# Patient Record
Sex: MALE | Race: BLACK OR AFRICAN AMERICAN | NOT HISPANIC OR LATINO | URBAN - METROPOLITAN AREA
[De-identification: names, ages, dates, MRNs, and addresses within clinical notes are randomized per-mention and may not be internally consistent; named-entity substitution may affect disease eponyms.]

---

## 2020-04-08 ENCOUNTER — INPATIENT (INPATIENT)
Facility: HOSPITAL | Age: 45
LOS: 3 days | Discharge: HOME HEALTH | End: 2020-04-12
Attending: INTERNAL MEDICINE | Admitting: INTERNAL MEDICINE
Payer: MEDICARE

## 2020-04-08 VITALS
RESPIRATION RATE: 16 BRPM | DIASTOLIC BLOOD PRESSURE: 59 MMHG | SYSTOLIC BLOOD PRESSURE: 120 MMHG | HEART RATE: 78 BPM | TEMPERATURE: 99 F | WEIGHT: 250 LBS | OXYGEN SATURATION: 100 %

## 2020-04-08 LAB
ALBUMIN SERPL ELPH-MCNC: 4.2 G/DL — SIGNIFICANT CHANGE UP (ref 3.5–5.2)
ALP SERPL-CCNC: 154 U/L — HIGH (ref 30–115)
ALT FLD-CCNC: 62 U/L — HIGH (ref 0–41)
AMMONIA BLD-MCNC: 62 UMOL/L — HIGH (ref 11–55)
ANION GAP SERPL CALC-SCNC: 19 MMOL/L — HIGH (ref 7–14)
ANION GAP SERPL CALC-SCNC: 30 MMOL/L — HIGH (ref 7–14)
ANION GAP SERPL CALC-SCNC: 34 MMOL/L — HIGH (ref 7–14)
APAP SERPL-MCNC: <5 UG/ML — LOW (ref 10–30)
APPEARANCE UR: CLEAR — SIGNIFICANT CHANGE UP
AST SERPL-CCNC: 70 U/L — HIGH (ref 0–41)
B-OH-BUTYR SERPL-SCNC: 7.3 MMOL/L — HIGH
BACTERIA # UR AUTO: NEGATIVE — SIGNIFICANT CHANGE UP
BASE EXCESS BLDV CALC-SCNC: -18 MMOL/L — LOW (ref -2–2)
BASOPHILS # BLD AUTO: 0.01 K/UL — SIGNIFICANT CHANGE UP (ref 0–0.2)
BASOPHILS NFR BLD AUTO: 0.1 % — SIGNIFICANT CHANGE UP (ref 0–1)
BILIRUB SERPL-MCNC: 0.5 MG/DL — SIGNIFICANT CHANGE UP (ref 0.2–1.2)
BILIRUB UR-MCNC: NEGATIVE — SIGNIFICANT CHANGE UP
BUN SERPL-MCNC: 78 MG/DL — CRITICAL HIGH (ref 10–20)
BUN SERPL-MCNC: 83 MG/DL — CRITICAL HIGH (ref 10–20)
BUN SERPL-MCNC: 86 MG/DL — CRITICAL HIGH (ref 10–20)
CA-I SERPL-SCNC: 1.06 MMOL/L — LOW (ref 1.12–1.3)
CALCIUM SERPL-MCNC: 8.1 MG/DL — LOW (ref 8.5–10.1)
CALCIUM SERPL-MCNC: 9.2 MG/DL — SIGNIFICANT CHANGE UP (ref 8.5–10.1)
CALCIUM SERPL-MCNC: 9.5 MG/DL — SIGNIFICANT CHANGE UP (ref 8.5–10.1)
CHLORIDE SERPL-SCNC: 82 MMOL/L — LOW (ref 98–110)
CHLORIDE SERPL-SCNC: 93 MMOL/L — LOW (ref 98–110)
CHLORIDE SERPL-SCNC: 97 MMOL/L — LOW (ref 98–110)
CK SERPL-CCNC: 2387 U/L — HIGH (ref 0–225)
CO2 SERPL-SCNC: 14 MMOL/L — LOW (ref 17–32)
CO2 SERPL-SCNC: 23 MMOL/L — SIGNIFICANT CHANGE UP (ref 17–32)
CO2 SERPL-SCNC: 8 MMOL/L — CRITICAL LOW (ref 17–32)
COLOR SPEC: SIGNIFICANT CHANGE UP
CREAT SERPL-MCNC: 2.1 MG/DL — HIGH (ref 0.7–1.5)
CREAT SERPL-MCNC: 2.5 MG/DL — HIGH (ref 0.7–1.5)
CREAT SERPL-MCNC: 2.6 MG/DL — HIGH (ref 0.7–1.5)
D DIMER BLD IA.RAPID-MCNC: 437 NG/ML DDU — HIGH (ref 0–230)
DIFF PNL FLD: ABNORMAL
EOSINOPHIL # BLD AUTO: 0 K/UL — SIGNIFICANT CHANGE UP (ref 0–0.7)
EOSINOPHIL NFR BLD AUTO: 0 % — SIGNIFICANT CHANGE UP (ref 0–8)
EPI CELLS # UR: 1 /HPF — SIGNIFICANT CHANGE UP (ref 0–5)
ETHANOL SERPL-MCNC: <10 MG/DL — SIGNIFICANT CHANGE UP
FERRITIN SERPL-MCNC: 2085 NG/ML — HIGH (ref 30–400)
FIBRINOGEN PPP-MCNC: 473 MG/DL — SIGNIFICANT CHANGE UP (ref 204.4–570.6)
GAS PNL BLDV: 125 MMOL/L — LOW (ref 136–145)
GAS PNL BLDV: SIGNIFICANT CHANGE UP
GLUCOSE BLDC GLUCOMTR-MCNC: 255 MG/DL — HIGH (ref 70–99)
GLUCOSE BLDC GLUCOMTR-MCNC: 281 MG/DL — HIGH (ref 70–99)
GLUCOSE BLDC GLUCOMTR-MCNC: 290 MG/DL — HIGH (ref 70–99)
GLUCOSE BLDC GLUCOMTR-MCNC: 303 MG/DL — HIGH (ref 70–99)
GLUCOSE BLDC GLUCOMTR-MCNC: 358 MG/DL — HIGH (ref 70–99)
GLUCOSE BLDC GLUCOMTR-MCNC: 363 MG/DL — HIGH (ref 70–99)
GLUCOSE BLDC GLUCOMTR-MCNC: 440 MG/DL — HIGH (ref 70–99)
GLUCOSE BLDC GLUCOMTR-MCNC: 453 MG/DL — CRITICAL HIGH (ref 70–99)
GLUCOSE BLDC GLUCOMTR-MCNC: 467 MG/DL — CRITICAL HIGH (ref 70–99)
GLUCOSE BLDC GLUCOMTR-MCNC: >600 MG/DL — CRITICAL HIGH (ref 70–99)
GLUCOSE SERPL-MCNC: 289 MG/DL — HIGH (ref 70–99)
GLUCOSE SERPL-MCNC: 447 MG/DL — HIGH (ref 70–99)
GLUCOSE SERPL-MCNC: 861 MG/DL — CRITICAL HIGH (ref 70–99)
GLUCOSE UR QL: ABNORMAL
HCO3 BLDV-SCNC: 11 MMOL/L — LOW (ref 22–29)
HCT VFR BLD CALC: 47.7 % — SIGNIFICANT CHANGE UP (ref 42–52)
HCT VFR BLDA CALC: 51.8 % — HIGH (ref 34–44)
HGB BLD CALC-MCNC: 16.9 G/DL — SIGNIFICANT CHANGE UP (ref 14–18)
HGB BLD-MCNC: 15.8 G/DL — SIGNIFICANT CHANGE UP (ref 14–18)
HYALINE CASTS # UR AUTO: 6 /LPF — SIGNIFICANT CHANGE UP (ref 0–7)
IMM GRANULOCYTES NFR BLD AUTO: 0.4 % — HIGH (ref 0.1–0.3)
KETONES UR-MCNC: ABNORMAL
LACTATE BLDV-MCNC: 2.8 MMOL/L — HIGH (ref 0.5–1.6)
LACTATE SERPL-SCNC: 2.4 MMOL/L — HIGH (ref 0.7–2)
LDH SERPL L TO P-CCNC: 957 U/L — HIGH (ref 50–242)
LEUKOCYTE ESTERASE UR-ACNC: NEGATIVE — SIGNIFICANT CHANGE UP
LIDOCAIN IGE QN: 259 U/L — HIGH (ref 7–60)
LYMPHOCYTES # BLD AUTO: 1.1 K/UL — LOW (ref 1.2–3.4)
LYMPHOCYTES # BLD AUTO: 11 % — LOW (ref 20.5–51.1)
MCHC RBC-ENTMCNC: 30.2 PG — SIGNIFICANT CHANGE UP (ref 27–31)
MCHC RBC-ENTMCNC: 33.1 G/DL — SIGNIFICANT CHANGE UP (ref 32–37)
MCV RBC AUTO: 91 FL — SIGNIFICANT CHANGE UP (ref 80–94)
MONOCYTES # BLD AUTO: 0.61 K/UL — HIGH (ref 0.1–0.6)
MONOCYTES NFR BLD AUTO: 6.1 % — SIGNIFICANT CHANGE UP (ref 1.7–9.3)
NEUTROPHILS # BLD AUTO: 8.23 K/UL — HIGH (ref 1.4–6.5)
NEUTROPHILS NFR BLD AUTO: 82.4 % — HIGH (ref 42.2–75.2)
NITRITE UR-MCNC: NEGATIVE — SIGNIFICANT CHANGE UP
NRBC # BLD: 0 /100 WBCS — SIGNIFICANT CHANGE UP (ref 0–0)
NT-PROBNP SERPL-SCNC: 61 PG/ML — SIGNIFICANT CHANGE UP (ref 0–300)
PCO2 BLDV: 35 MMHG — LOW (ref 41–51)
PH BLDV: 7.1 — LOW (ref 7.26–7.43)
PH UR: 6 — SIGNIFICANT CHANGE UP (ref 5–8)
PLATELET # BLD AUTO: 210 K/UL — SIGNIFICANT CHANGE UP (ref 130–400)
PO2 BLDV: 51 MMHG — HIGH (ref 20–40)
POTASSIUM BLDV-SCNC: 6.7 MMOL/L — HIGH (ref 3.3–5.6)
POTASSIUM SERPL-MCNC: 4.8 MMOL/L — SIGNIFICANT CHANGE UP (ref 3.5–5)
POTASSIUM SERPL-MCNC: 5 MMOL/L — SIGNIFICANT CHANGE UP (ref 3.5–5)
POTASSIUM SERPL-MCNC: SIGNIFICANT CHANGE UP MMOL/L (ref 3.5–5)
POTASSIUM SERPL-SCNC: 4.8 MMOL/L — SIGNIFICANT CHANGE UP (ref 3.5–5)
POTASSIUM SERPL-SCNC: 5 MMOL/L — SIGNIFICANT CHANGE UP (ref 3.5–5)
POTASSIUM SERPL-SCNC: SIGNIFICANT CHANGE UP MMOL/L (ref 3.5–5)
PROCALCITONIN SERPL-MCNC: 0.61 NG/ML — HIGH (ref 0.02–0.1)
PROT SERPL-MCNC: 8.5 G/DL — HIGH (ref 6–8)
PROT UR-MCNC: SIGNIFICANT CHANGE UP
RBC # BLD: 5.24 M/UL — SIGNIFICANT CHANGE UP (ref 4.7–6.1)
RBC # FLD: 12.3 % — SIGNIFICANT CHANGE UP (ref 11.5–14.5)
RBC CASTS # UR COMP ASSIST: 5 /HPF — HIGH (ref 0–4)
SALICYLATES SERPL-MCNC: <0.3 MG/DL — LOW (ref 4–30)
SAO2 % BLDV: 78 % — SIGNIFICANT CHANGE UP
SARS-COV-2 RNA SPEC QL NAA+PROBE: SIGNIFICANT CHANGE UP
SODIUM SERPL-SCNC: 124 MMOL/L — LOW (ref 135–146)
SODIUM SERPL-SCNC: 137 MMOL/L — SIGNIFICANT CHANGE UP (ref 135–146)
SODIUM SERPL-SCNC: 139 MMOL/L — SIGNIFICANT CHANGE UP (ref 135–146)
SP GR SPEC: 1.02 — SIGNIFICANT CHANGE UP (ref 1.01–1.02)
TRIGL SERPL-MCNC: 391 MG/DL — HIGH (ref 10–149)
TROPONIN T SERPL-MCNC: <0.01 NG/ML — SIGNIFICANT CHANGE UP
UROBILINOGEN FLD QL: SIGNIFICANT CHANGE UP
WBC # BLD: 9.99 K/UL — SIGNIFICANT CHANGE UP (ref 4.8–10.8)
WBC # FLD AUTO: 9.99 K/UL — SIGNIFICANT CHANGE UP (ref 4.8–10.8)
WBC UR QL: 2 /HPF — SIGNIFICANT CHANGE UP (ref 0–5)

## 2020-04-08 PROCEDURE — 93010 ELECTROCARDIOGRAM REPORT: CPT

## 2020-04-08 PROCEDURE — 70450 CT HEAD/BRAIN W/O DYE: CPT | Mod: 26

## 2020-04-08 PROCEDURE — 71045 X-RAY EXAM CHEST 1 VIEW: CPT | Mod: 26

## 2020-04-08 PROCEDURE — 99291 CRITICAL CARE FIRST HOUR: CPT

## 2020-04-08 PROCEDURE — 74177 CT ABD & PELVIS W/CONTRAST: CPT | Mod: 26

## 2020-04-08 RX ORDER — INSULIN HUMAN 100 [IU]/ML
10 INJECTION, SOLUTION SUBCUTANEOUS ONCE
Refills: 0 | Status: COMPLETED | OUTPATIENT
Start: 2020-04-08 | End: 2020-04-08

## 2020-04-08 RX ORDER — NITROGLYCERIN 6.5 MG
0.4 CAPSULE, EXTENDED RELEASE ORAL ONCE
Refills: 0 | Status: COMPLETED | OUTPATIENT
Start: 2020-04-08 | End: 2020-04-08

## 2020-04-08 RX ORDER — SODIUM CHLORIDE 9 MG/ML
1000 INJECTION, SOLUTION INTRAVENOUS
Refills: 0 | Status: DISCONTINUED | OUTPATIENT
Start: 2020-04-08 | End: 2020-04-09

## 2020-04-08 RX ORDER — SODIUM CHLORIDE 9 MG/ML
2000 INJECTION, SOLUTION INTRAVENOUS ONCE
Refills: 0 | Status: COMPLETED | OUTPATIENT
Start: 2020-04-08 | End: 2020-04-08

## 2020-04-08 RX ORDER — SODIUM CHLORIDE 9 MG/ML
1000 INJECTION, SOLUTION INTRAVENOUS
Refills: 0 | Status: DISCONTINUED | OUTPATIENT
Start: 2020-04-08 | End: 2020-04-08

## 2020-04-08 RX ORDER — PANTOPRAZOLE SODIUM 20 MG/1
40 TABLET, DELAYED RELEASE ORAL
Refills: 0 | Status: DISCONTINUED | OUTPATIENT
Start: 2020-04-08 | End: 2020-04-09

## 2020-04-08 RX ORDER — CHLORHEXIDINE GLUCONATE 213 G/1000ML
1 SOLUTION TOPICAL
Refills: 0 | Status: DISCONTINUED | OUTPATIENT
Start: 2020-04-08 | End: 2020-04-12

## 2020-04-08 RX ORDER — HEPARIN SODIUM 5000 [USP'U]/ML
5000 INJECTION INTRAVENOUS; SUBCUTANEOUS EVERY 8 HOURS
Refills: 0 | Status: DISCONTINUED | OUTPATIENT
Start: 2020-04-08 | End: 2020-04-11

## 2020-04-08 RX ORDER — CALCIUM GLUCONATE 100 MG/ML
1 VIAL (ML) INTRAVENOUS ONCE
Refills: 0 | Status: COMPLETED | OUTPATIENT
Start: 2020-04-08 | End: 2020-04-08

## 2020-04-08 RX ORDER — SODIUM CHLORIDE 9 MG/ML
1000 INJECTION INTRAMUSCULAR; INTRAVENOUS; SUBCUTANEOUS
Refills: 0 | Status: DISCONTINUED | OUTPATIENT
Start: 2020-04-08 | End: 2020-04-08

## 2020-04-08 RX ORDER — SODIUM CHLORIDE 9 MG/ML
1000 INJECTION INTRAMUSCULAR; INTRAVENOUS; SUBCUTANEOUS ONCE
Refills: 0 | Status: DISCONTINUED | OUTPATIENT
Start: 2020-04-08 | End: 2020-04-08

## 2020-04-08 RX ORDER — INSULIN HUMAN 100 [IU]/ML
10 INJECTION, SOLUTION SUBCUTANEOUS
Qty: 100 | Refills: 0 | Status: DISCONTINUED | OUTPATIENT
Start: 2020-04-08 | End: 2020-04-09

## 2020-04-08 RX ADMIN — SODIUM CHLORIDE 1000 MILLILITER(S): 9 INJECTION, SOLUTION INTRAVENOUS at 09:06

## 2020-04-08 RX ADMIN — Medication 0.4 MILLIGRAM(S): at 13:17

## 2020-04-08 RX ADMIN — SODIUM CHLORIDE 150 MILLILITER(S): 9 INJECTION, SOLUTION INTRAVENOUS at 22:28

## 2020-04-08 RX ADMIN — Medication 100 GRAM(S): at 10:05

## 2020-04-08 RX ADMIN — INSULIN HUMAN 10 UNIT(S): 100 INJECTION, SOLUTION SUBCUTANEOUS at 13:55

## 2020-04-08 RX ADMIN — INSULIN HUMAN 10 UNIT(S): 100 INJECTION, SOLUTION SUBCUTANEOUS at 10:00

## 2020-04-08 RX ADMIN — HEPARIN SODIUM 5000 UNIT(S): 5000 INJECTION INTRAVENOUS; SUBCUTANEOUS at 21:50

## 2020-04-08 RX ADMIN — Medication 2 MILLIGRAM(S): at 16:54

## 2020-04-08 RX ADMIN — Medication 2 MILLIGRAM(S): at 23:20

## 2020-04-08 RX ADMIN — Medication 2 MILLIGRAM(S): at 11:35

## 2020-04-08 NOTE — ED PROVIDER NOTE - OBJECTIVE STATEMENT
44 y.o male w/ hx of autism, per EMS presents to the ED for evaluation of AMS.  Per EMS family states pt has been altered since Saturday. Spoke w/ pt's sister.  Since Saturday has been "off" which has become progressively worse, including urinary incontinence, polyuria, polydipsia,  disoriented.  No focal weakness.  Since this morning difficulty w/ ambulation prompting visit to the ED. Per family no known trauma. Per EMS FS > 600.  In ED A&O x 2. Mumbling when questions are answered. I am unable to obtain a comprehensive history, review of systems, past medical history, and/or physical exam due to constraints imposed by the urgency of the patient's clinical condition and/or mental status.

## 2020-04-08 NOTE — H&P ADULT - ASSESSMENT
IMPRESSION:  DKA / HHS   GISELA  Possible rhabdomyolysis    PLAN:    CNS: Avoid depressants     HEENT:  Oral care    PULMONARY:  HOB @ 45 degrees    CARDIOVASCULAR: Aggressive Hydration with NS.       GI: GI prophylaxis                                          Feeding As tolerated.      RENAL:  F/u  lytes Q4.  Correct as needed. accurate I/O.       INFECTIOUS DISEASE: Pan cultures      HEMATOLOGICAL:  DVT prophylaxis.    ENDOCRINE: insulin drip 0.1unit/kg/hr. F/u FS. Check TG, Follow up ct scan a/p.    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    IMPRESSION:  DKA / HHS   GISELA  Possible rhabdomyolysis    PLAN:    CNS: Avoid depressants, patient not on any sedatives. Monitor for mental status improvement     HEENT:  Oral care, speak to patient from his left side ( deafness in right ear)    PULMONARY:  HOB @ 45 degrees    CARDIOVASCULAR: NSR. Stable BP, not on pressors    GI: GI prophylaxis with protonix 40 mg PO qd    RENAL:  F/u  lytes Q4h and correct as needed. Strict I&O. Will put primafit. F/up potassium at 16:00 and correct as needed.     INFECTIOUS DISEASE: F/up blood and urine cultures. No AB for now. afebrile. F/up COVID-19 PCR. If negative downgrade to non COVID intensive care unit.    HEMATOLOGICAL:  DVT prophylaxis with heparin subq    ENDOCRINE: insulin drip 0.1unit/kg/hr. F/u FS q2h. F/up TG and A1C. Trend CK at 16:00 and in am. F/up Beta hydroxybutyrate No evidence of pancreatitis on labs and imaging.  When FS <250, change IVF to D5, 0.45% NS.  When AG closes (<12) and acidosis resolve ( biacrb >18), give long acting insulin overlap 1 hour with IV insulin infusion and then d/c IV insulin 2 hours after glargine is given ( dose glargine based on GFR)  Start feeding when patient is more alert and can tolerate PO feeds.    MUSCULOSKELETAL: bed rest for now    CODE STATUS: FULL CODE    * Person to contact for information and to give updates is [place] Michael ( sister): [phone]*

## 2020-04-08 NOTE — ED PROVIDER NOTE - CLINICAL SUMMARY MEDICAL DECISION MAKING FREE TEXT BOX
Pt presented to ED for AMS, elevated blood glucose. Labs, imaging obtained. Pt with HAGMA with elevated glucose, ketones. Insulin bolus and drip given. Pt with hyperkalemia + EKg changes. Calcium given. Discussed with ICU, will admit.

## 2020-04-08 NOTE — H&P ADULT - HISTORY OF PRESENT ILLNESS
44 y.o male w/ hx of autism, per EMS presents to the ED for evaluation of AMS.  Per EMS family states pt has been altered since Saturday. Spoke w/ pt's sister.  Since Saturday has been "off" which has become progressively worse, including urinary incontinence, polyuria, polydipsia,  disoriented.  No focal weakness.  Since this morning difficulty w/ ambulation prompting visit to the ED. Per family no known trauma. Per EMS FS > 600.  In ED A&O x 2. Mumbling when questions are answered. I am unable to obtain a comprehensive history, review of systems, past medical history, and/or physical exam due to constraints imposed by the urgency of the patient's clinical condition and/or mental status. 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 History taken from his sister Michael, patient started having polydipsia 2 weeks ago and was drinking tap water at work ( [employer]) which was unusual for him ( he brings his own bottles). Last saturday, he started having polyuria at a point that he was peeing himself because he could not control his bladder. He came back from work exhausted and noted that he has been having dry mouth. Yesterday, he started to be disoriented, saying incoherent sentences and he even got more confused and lethargic this morning. No fever, chills, SOB, cough or myalgias. No abdominal pain or chest pain. Rest of ROS is negative per sister. Last visit to his PMD was last year and labs were normal. He does not take any medications. HE has a significant family history of type 2 DM ( sister, father and aunt with microvascular complications and death in aunt).  In ED, Glucose was >600, AG 34, Bicarb 8, Creatinine 2.6, K 6.7( on VBG), Na 124 and CK 2907. B hydroxybutyrate was not sent He is HD stable. CXR wnl, CT A/P negative for pancreatitis, CT head unremarkable. UA positive for ketones and glucose; negative for UTI. EKG showing NSR with peaked T waves, QTc is 480. VBG showing pH of 7.1, pO2 of 51 and pCO2 of 51.  He is s/p 20 U regular insulin IV and 2 L of LR. He was started on insulin drip and LR at 250 cc/hr. Repeat Glucose was 460 on POCT.

## 2020-04-08 NOTE — ED PROVIDER NOTE - PROGRESS NOTE DETAILS
PODLOG: Labs reviewed. Pt with hyperkalemia, + EKG changes on EKg. Calcium and insulin order. Will start insulin drip for probable DKA.

## 2020-04-08 NOTE — CONSULT NOTE ADULT - SUBJECTIVE AND OBJECTIVE BOX
Patient is a 44y old  Male who presents with a chief complaint of     HPI:44 y.o male w/ hx of autism, per EMS presents to the ED for evaluation of AMS.  Per EMS family states pt has been altered since Saturday. Spoke w/ pt's sister.  Since Saturday has been "off" which has become progressively worse, including urinary incontinence, polyuria, polydipsia,  disoriented.  No focal weakness.  Since this morning difficulty w/ ambulation prompting visit to the ED. Per family no known trauma. Per EMS FS > 600.  In ED A&O x 2. Mumbling when questions are answered. I am unable to obtain a comprehensive history, review of systems, past medical history, and/or physical exam due to constraints imposed by the urgency of the patient's clinical condition and/or mental status.      PAST MEDICAL & SURGICAL HISTORY:  Autism      SOCIAL HX:   Smoking     unknown                    ETOH         unknown                   Other unknown    FAMILY HISTORY:  :  No known cardiovacular family hisotry   +ve family hisotry of diabetes    Review Of Systems:     CONSTITUTIONAL:   no fever   no chills.  no weight gain   no weight loss    EYES:   no discharge,   no pain  no redness,   no visual changes.    ENT:   Ears: no ear pain and no hearing problems.  Nose: no nasal congestion and no nasal drainage.  Mouth/Throat: no dysphagia,  no hoarseness and no throat pain.  Neck: no lumps, no pain, no stiffness and no swollen glands.     CARDIOVASCULAR:   no chest pain,   no swelling  no palpitaions  no syncope    RESPIRATORY:  no SOB,  no wheezing ,  no respiratory difficulty  no sputum production    GASTROINTESTINAL:   no abdominal pain,   no constipation,   no diarrhea,   no vomiting.    GENITOURINARY:  no dysuria,   no frequency,   no urgency  no hematuria.    MUSCULOSKELETAL:   no back pain,   no musculoskeletal pain,  no weakness.    SKIN:   no jaundice,   no lesions,   no pruritis,   no rashes.    NEURO:   See HPI    PSYCHIATRIC:   no known mental health issues  no anxiety  no depression    ALLERGIC/IMMUNOLOGIC:   No active allergic or immunologic issues      Allergies    No Known Allergies    Intolerances          PHYSICAL EXAM    ICU Vital Signs Last 24 Hrs  T(C): 36.8 (2020 09:03), Max: 37 (2020 08:36)  T(F): 98.2 (2020 09:03), Max: 98.6 (2020 08:36)  HR: 78 (2020 10:28) (77 - 84)  BP: 127/69 (2020 10:28) (111/64 - 127/69)  RR: 18 (2020 10:28) (16 - 18)  SpO2: 99% (2020 10:28) (99% - 100%)      CONSTITUTIONAL:   Ill appearing.  obese.  tachypneic     ENT:     Airway patent,   Mouth with normal mucosa.   No thrush    EYES:   pupils equal,   round and reactive to light.    CARDIAC:   Normal rate,   Regular rhythm.    Heart sounds S1, S2.   +ve trace LE edema      Vascular:   normal systolic impulse  no bruits    RESPIRATORY:   No wheezing   Normal chest expansion  tachypneic    GASTROINTESTINAL:  Abdomen soft,    Non-tender,   + BS    MUSCULOSKELETAL:   Range of motion is not limited,  No clubbing, cyanosis    NEUROLOGICAL:   Alert and orientedx3   No motor or sensory deficits.  Pertinent DTRs normal    SKIN:   Skin normal color for race,   Warm and dry  No evidence of rash.    PSYCHIATRIC:   not suicidal      LABS:                          15.8   9.99  )-----------( 210      ( 2020 08:54 )             47.7                                               04-08    124<L>  |  82<L>  |  78<HH>  ----------------------------<  861<HH>  TNP(hemolyzed)   |  8<LL>  |  2.6<H>    Ca    8.1<L>      2020 08:54    TPro  8.5<H>  /  Alb  4.2  /  TBili  0.5  /  DBili  x   /  AST  70<H>  /  ALT  62<H>  /  AlkPhos  154<H>  04-08                                             Urinalysis Basic - ( 2020 08:54 )    Color: Light Yellow / Appearance: Clear / S.022 / pH: x  Gluc: x / Ketone: Moderate  / Bili: Negative / Urobili: <2 mg/dL   Blood: x / Protein: Trace / Nitrite: Negative   Leuk Esterase: Negative / RBC: 5 /HPF / WBC 2 /HPF   Sq Epi: x / Non Sq Epi: 1 /HPF / Bacteria: Negative        CARDIAC MARKERS ( 2020 08:54 )  x     / <0.01 ng/mL / 2907 U/L / x     / x                                                LIVER FUNCTIONS - ( 2020 08:54 )  Alb: 4.2 g/dL / Pro: 8.5 g/dL / ALK PHOS: 154 U/L / ALT: 62 U/L / AST: 70 U/L / GGT: x                                                                                                                                       X-Rays reviewed:                                                                                    ECHO    CXR interpreted by me:    MEDICATIONS  (STANDING):  insulin regular  human recombinant. 10 Unit(s) IV Push once  insulin regular Infusion 10 Unit(s)/Hr (10 mL/Hr) IV Continuous <Continuous>  LORazepam   Injectable 2 milliGRAM(s) IV Push once  nitroglycerin     SubLingual 0.4 milliGRAM(s) SubLingual Once    MEDICATIONS  (PRN): Patient is a 44y old  Male who presents with a chief complaint of     HPI:44 y.o male w/ hx of autism, per EMS presents to the ED for evaluation of AMS.  Per EMS family states pt has been altered since Saturday. Spoke w/ pt's sister.  Since Saturday has been "off" which has become progressively worse, including urinary incontinence, polyuria, polydipsia,  disoriented.  No focal weakness.  Since this morning difficulty w/ ambulation prompting visit to the ED. Per family no known trauma. Per EMS FS > 600.  In ED A&O x 2. Mumbling when questions are answered. I am unable to obtain a comprehensive history, review of systems, past medical history, and/or physical exam due to constraints imposed by the urgency of the patient's clinical condition and/or mental status.      PAST MEDICAL & SURGICAL HISTORY:  Autism      SOCIAL HX:   Smoking     unknown                    ETOH         unknown                   Other unknown    FAMILY HISTORY:  :  No known cardiovacular family hisotry   +ve family hisotry of diabetes    Review Of Systems:     CONSTITUTIONAL:   no fever   no chills.  no weight gain   no weight loss    EYES:   no discharge,   no pain  no redness,   no visual changes.    ENT:   Ears: no ear pain and no hearing problems.  Nose: no nasal congestion and no nasal drainage.  Mouth/Throat: no dysphagia,  no hoarseness and no throat pain.  Neck: no lumps, no pain, no stiffness and no swollen glands.     CARDIOVASCULAR:   no chest pain,   no swelling  no palpitaions  no syncope    RESPIRATORY:  no SOB,  no wheezing ,  no respiratory difficulty  no sputum production    GASTROINTESTINAL:   no abdominal pain,   no constipation,   no diarrhea,   no vomiting.    GENITOURINARY:  no dysuria,   no frequency,   no urgency  no hematuria.    MUSCULOSKELETAL:   no back pain,   no musculoskeletal pain,  no weakness.    SKIN:   no jaundice,   no lesions,   no pruritis,   no rashes.    NEURO:   See HPI    PSYCHIATRIC:   no known mental health issues  no anxiety  no depression    ALLERGIC/IMMUNOLOGIC:   No active allergic or immunologic issues      Allergies    No Known Allergies    Intolerances          PHYSICAL EXAM    ICU Vital Signs Last 24 Hrs  T(C): 36.8 (2020 09:03), Max: 37 (2020 08:36)  T(F): 98.2 (2020 09:03), Max: 98.6 (2020 08:36)  HR: 78 (2020 10:28) (77 - 84)  BP: 127/69 (2020 10:28) (111/64 - 127/69)  RR: 18 (2020 10:28) (16 - 18)  SpO2: 99% (2020 10:28) (99% - 100%)      CONSTITUTIONAL:   Ill appearing.  obese.  tachypneic     ENT:     Airway patent,   Mouth with normal mucosa.   No thrush    EYES:   pupils equal,   round and reactive to light.    CARDIAC:   Normal rate,   Regular rhythm.    Heart sounds S1, S2.   +ve trace LE edema      Vascular:   normal systolic impulse  no bruits    RESPIRATORY:   No wheezing   Normal chest expansion  tachypneic    GASTROINTESTINAL:  Abdomen soft,    Non-tender,   + BS    MUSCULOSKELETAL:   Range of motion is not limited,  No clubbing, cyanosis    NEUROLOGICAL:   Alert and orientedx3   No motor or sensory deficits.  Pertinent DTRs normal    SKIN:   Skin normal color for race,   Warm and dry  No evidence of rash.    PSYCHIATRIC:   not suicidal      LABS:                          15.8   9.99  )-----------( 210      ( 2020 08:54 )             47.7                                               04-08    x  82<L>  |  78<HH>  ----------------------------<  861<HH>  TNP(hemolyzed)   |  8<LL>  |  2.6<H>    Ca    8.1<L>      2020 08:54    TPro  8.5<H>  /  Alb  4.2  /  TBili  0.5  /  DBili  x   /  AST  70<H>  /  ALT  62<H>  /  AlkPhos  154<H>  04-08                                             Urinalysis Basic - ( 2020 08:54 )    Color: Light Yellow / Appearance: Clear / S.022 / pH: x  Gluc: x / Ketone: Moderate  / Bili: Negative / Urobili: <2 mg/dL   Blood: x / Protein: Trace / Nitrite: Negative   Leuk Esterase: Negative / RBC: 5 /HPF / WBC 2 /HPF   Sq Epi: x / Non Sq Epi: 1 /HPF / Bacteria: Negative        CARDIAC MARKERS ( 2020 08:54 )  x     / <0.01 ng/mL / 2907 U/L / x     / x                                                LIVER FUNCTIONS - ( 2020 08:54 )  Alb: 4.2 g/dL / Pro: 8.5 g/dL / ALK PHOS: 154 U/L / ALT: 62 U/L / AST: 70 U/L / GGT: x                                                                                                                                       X-Rays reviewed:                                                                                    ECHO    CXR interpreted by me:    MEDICATIONS  (STANDING):  insulin regular  human recombinant. 10 Unit(s) IV Push once  insulin regular Infusion 10 Unit(s)/Hr (10 mL/Hr) IV Continuous <Continuous>  LORazepam   Injectable 2 milliGRAM(s) IV Push once  nitroglycerin     SubLingual 0.4 milliGRAM(s) SubLingual Once    MEDICATIONS  (PRN):

## 2020-04-08 NOTE — CONSULT NOTE ADULT - ASSESSMENT
IMPRESSION:    DKA  r/o COVID    PLAN:    CNS: avoid depressants     HEENT:  Oral care    PULMONARY:  HOB @ 45 degrees    CARDIOVASCULAR:  cc/hr, keep map>65, if hypotensive bolus 1 L of LR.  Repeat trops/EKG     GI: GI prophylaxis                                          Feeding npo    RENAL:  F/u  lytes.  Correct as needed. accurate I/O  repeat BMP stat, repeat VBG in 4 hrs.   INFECTIOUS DISEASE: r/o COVID-19    HEMATOLOGICAL:  DVT prophylaxis.    ENDOCRINE: insulin drip 0.1unit/kg/hr. F/u FS. Check TG, Follow up ct scan a/p.    MUSCULOSKELETAL: bed rest for now    CODE STATUS: FULL CODE    DISPOSITION: Pt requires continued monitoring in the MICU IMPRESSION:    DKA / HHS     PLAN:    CNS: Avoid depressants     HEENT:  Oral care    PULMONARY:  HOB @ 45 degrees    CARDIOVASCULAR: Aggressive Hydration with NS.       GI: GI prophylaxis                                          Feeding As tolerated.      RENAL:  F/u  lytes Q4.  Correct as needed. accurate I/O.       INFECTIOUS DISEASE: Pan cultures      HEMATOLOGICAL:  DVT prophylaxis.    ENDOCRINE: insulin drip 0.1unit/kg/hr. F/u FS. Check TG, Follow up ct scan a/p.    MUSCULOSKELETAL: bed rest for now    CODE STATUS: FULL CODE    DISPOSITION: Pt requires continued monitoring in the MICU

## 2020-04-08 NOTE — H&P ADULT - NSICDXPASTMEDICALHX_GEN_ALL_CORE_FT
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

## 2020-04-08 NOTE — ED PROVIDER NOTE - ATTENDING CONTRIBUTION TO CARE
I personally evaluated the patient. I reviewed the Resident’s or Physician Assistant’s note (as assigned above), and agree with the findings and plan except as documented in my note.    Pt is a 43 y/o male with autism, presents to ED for AMS over the past several days, moderate, constant, worse since onset. + polyuria/polydipsia. No n/v/d, abd pain, chest pain, SOB, fever. Limited hx obtained as patient is lethargic, need for urgent intervention.    Constitutional: Well developed, well nourished. NAD.  Head: Normocephalic, atraumatic.  Eyes: PERRL. EOMI.  ENT: No nasal discharge. Mucous membranes dry.  Neck: Supple. Painless ROM.  Cardiovascular: Normal S1, S2. Regular rate and rhythm. No murmurs, rubs, or gallops.  Pulmonary: Normal respiratory rate and effort. Lungs clear to auscultation bilaterally. No wheezing, rales, or rhonchi.  Abdominal: Soft. Nondistended. Nontender. No rebound, guarding, rigidity.  Extremities. Pelvis stable. No lower extremity edema, symmetric calves.  Skin: No rashes, cyanosis.  Neuro: AAOx1. No focal neurological deficits, follows commands, moves extremities, but appears lethargic.  Psych: Unable to assess. I personally evaluated the patient. I reviewed the Resident’s or Physician Assistant’s note (as assigned above), and agree with the findings and plan except as documented in my note.    Pt is a 45 y/o male with autism, presents to ED for AMS over the past several days, moderate, constant, worse since onset. + polyuria/polydipsia. No n/v/d, abd pain, chest pain, SOB, fever. Limited hx obtained as patient is lethargic, need for urgent intervention. FS > 600 by EMS and in triage.    Constitutional: Well developed, well nourished. NAD.  Head: Normocephalic, atraumatic.  Eyes: PERRL. EOMI.  ENT: No nasal discharge. Mucous membranes dry.  Neck: Supple. Painless ROM.  Cardiovascular: Normal S1, S2. Regular rate and rhythm. No murmurs, rubs, or gallops.  Pulmonary: Normal respiratory rate and effort. Lungs clear to auscultation bilaterally. No wheezing, rales, or rhonchi.  Abdominal: Soft. Nondistended. Nontender. No rebound, guarding, rigidity.  Extremities. Pelvis stable. No lower extremity edema, symmetric calves.  Skin: No rashes, cyanosis.  Neuro: AAOx1. No focal neurological deficits, follows commands, moves extremities, but appears lethargic.  Psych: Unable to assess.

## 2020-04-08 NOTE — H&P ADULT - NSHPPHYSICALEXAM_GEN_ALL_CORE
GEN: No acute distress  LUNGS: Clear to auscultation bilaterally   HEART: S1/S2 present. RRR.   ABD: Soft, non-tender, non-distended. Bowel sounds present  EXT: NC/NC/NE/2+PP/DAMICO/Skin Intact.   NEURO: AAOX3    Intravenous access: Peripheral access  NG tube: None  Hawkins Catheter: None GEN: Lethargy, AMS  HEENT: Reconstructed right earlobe since birth, born without an earlobe with congenital right ear deafness  LUNGS: Clear to auscultation bilaterally, no wheezing  HEART: S1/S2 present. RRR.   ABD: Soft, non-tender, non-distended. Bowel sounds present  EXT: No LE edema  NEURO: AAOX0    Intravenous access: Peripheral access  NG tube: None  Hawkins Catheter: None

## 2020-04-08 NOTE — ED PROVIDER NOTE - CARE PLAN
Principal Discharge DX:	DKA (diabetic ketoacidoses)  Secondary Diagnosis:	Hyperkalemia  Secondary Diagnosis:	GISELA (acute kidney injury)  Secondary Diagnosis:	Metabolic encephalopathy

## 2020-04-08 NOTE — CONSULT NOTE ADULT - ASSESSMENT
A/P: 44y  Male with new onset DM/DKA; +/- rhabdomyolysis  Agree with insulin drip and IV fluids.  Maintain insulin drip until anion gap closed and patient taking PO.  Check islet cell antibodies.  Lab w/u for elevated LFTs if remain elevated

## 2020-04-08 NOTE — PATIENT PROFILE ADULT - DISASTER - FUNCTIONAL SCREEN CURRENT LEVEL: SWALLOWING (IF SCORE 2 OR MORE FOR ANY ITEM, CONSULT REHAB SERVICES), MLM)
unable to assess at this time will continue to monitor. unable to assess at this time will continue to monitor./0 = swallows foods/liquids without difficulty

## 2020-04-08 NOTE — CONSULT NOTE ADULT - SUBJECTIVE AND OBJECTIVE BOX
HPI: 44y Male with history as below admitted with new onset DM/DKA associated with elevated LFTs and lipase.  Pt has had ~ 2 weeks of progressive lethargy, polyuria, polydipsia, incontinence.  BS > 800 on admission; has improved with IV hydration and IV insulin drip.      PAST MEDICAL & SURGICAL HISTORY:  Hearing loss: right ear, congenital ( born without an earlobe)  Cryptorchidism  Autism    FAMILY HISTORY:  FH: type 2 diabetes: Sister, Father, Aunt            Current (Non-Endocrine) Meds:  chlorhexidine 4% Liquid 1 Application(s) Topical <User Schedule>  dextrose 5% + sodium chloride 0.45%. 1000 milliLiter(s) IV Continuous <Continuous>  heparin  Injectable 5000 Unit(s) SubCutaneous every 8 hours  pantoprazole    Tablet 40 milliGRAM(s) Oral before breakfast      Current Endocrine Meds:   insulin regular Infusion 10 Unit(s)/Hr IV Continuous <Continuous>      Allergies:  No Known Allergies        Weight (kg): 113.4 ( @ 08:36)    Vital Signs Last 24 Hrs  T(C): 36.3 (2020 20:34), Max: 37 (2020 08:36)  T(F): 97.3 (2020 20:34), Max: 98.6 (2020 08:36)  HR: 88 (2020 20:34) (77 - 88)  BP: 109/80 (2020 20:34) (94/61 - 127/69)  BP(mean): --  RR: 18 (2020 20:34) (16 - 18)  SpO2: 98% (2020 20:34) (96% - 100%)    CAPILLARY BLOOD GLUCOSE      POCT Blood Glucose.: 255 mg/dL (2020 21:19)  POCT Blood Glucose.: 281 mg/dL (2020 20:31)  POCT Blood Glucose.: 303 mg/dL (2020 19:03)  POCT Blood Glucose.: 358 mg/dL (2020 18:08)  POCT Blood Glucose.: 363 mg/dL (2020 17:00)  POCT Blood Glucose.: 453 mg/dL (2020 16:09)  POCT Blood Glucose.: 440 mg/dL (2020 15:15)  POCT Blood Glucose.: 467 mg/dL (2020 14:19)  POCT Blood Glucose.: >600 mg/dL (2020 13:06)  POCT Blood Glucose.: >600 mg/dL (2020 12:02)  POCT Blood Glucose.: >600 mg/dL (2020 11:26)  POCT Blood Glucose.: >600 mg/dL (2020 10:24)      LABS:                        15.8   9.99  )-----------( 210      ( 2020 08:54 )             47.7         137  |  93<L>  |  83<HH>  ----------------------------<  447<H>  5.0   |  14<L>  |  2.5<H>    Ca    9.2      2020 16:04    TPro  8.5<H>  /  Alb  4.2  /  TBili  0.5  /  DBili  x   /  AST  70<H>  /  ALT  62<H>  /  AlkPhos  154<H>        Urinalysis Basic - ( 2020 08:54 )    Color: Light Yellow / Appearance: Clear / S.022 / pH: x  Gluc: x / Ketone: Moderate  / Bili: Negative / Urobili: <2 mg/dL   Blood: x / Protein: Trace / Nitrite: Negative   Leuk Esterase: Negative / RBC: 5 /HPF / WBC 2 /HPF   Sq Epi: x / Non Sq Epi: 1 /HPF / Bacteria: Negative    Lipase, Serum: 259 U/L (20 @ 08:54)    WL=7871 (admission);, 2387    CXR-small right basilar opacity    CT a/p-no evident peripancreatic inflammation or fluid

## 2020-04-08 NOTE — ED ADULT NURSE NOTE - OBJECTIVE STATEMENT
Pt. came in with altered metal status as per family. As per EMS, they were called for pt being confused and lethargic. Pt A&O x 2 pt aware of self and place, however unable to follow commands.

## 2020-04-08 NOTE — H&P ADULT - NSHPLABSRESULTS_GEN_ALL_CORE
15.8   9.99  )-----------( 210      ( 2020 08:54 )             47.7           124<L>  |  82<L>  |  78<HH>  ----------------------------<  861<HH>  TNP   |  8<LL>  |  2.6<H>    Ca    8.1<L>      2020 08:54    TPro  8.5<H>  /  Alb  4.2  /  TBili  0.5  /  DBili  x   /  AST  70<H>  /  ALT  62<H>  /  AlkPhos  154<H>                Urinalysis Basic - ( 2020 08:54 )    Color: Light Yellow / Appearance: Clear / S.022 / pH: x  Gluc: x / Ketone: Moderate  / Bili: Negative / Urobili: <2 mg/dL   Blood: x / Protein: Trace / Nitrite: Negative   Leuk Esterase: Negative / RBC: 5 /HPF / WBC 2 /HPF   Sq Epi: x / Non Sq Epi: 1 /HPF / Bacteria: Negative            Lactate Trend   @ 08:54 Lactate:2.4       CARDIAC MARKERS ( 2020 08:54 )  x     / <0.01 ng/mL / 2907 U/L / x     / x            CAPILLARY BLOOD GLUCOSE      POCT Blood Glucose.: 467 mg/dL (2020 14:19)

## 2020-04-08 NOTE — PATIENT PROFILE ADULT - DISASTER - FUNCTIONAL SCREEN CURRENT LEVEL: COMMUNICATION, MLM
unable to assess r/t mental status 0 = understands/communicates without difficulty/unable to assess r/t mental status

## 2020-04-08 NOTE — ED ADULT NURSE NOTE - NSIMPLEMENTINTERV_GEN_ALL_ED
Implemented All Fall with Harm Risk Interventions:  [place] to call system. Call bell, personal items and telephone within reach. Instruct patient to call for assistance. Room bathroom lighting operational. Non-slip footwear when patient is off stretcher. Physically safe environment: no spills, clutter or unnecessary equipment. Stretcher in lowest position, wheels locked, appropriate side rails in place. Provide visual cue, wrist band, yellow gown, etc. Monitor gait and stability. Monitor for mental status changes and reorient to person, place, and time. Review medications for side effects contributing to fall risk. Reinforce activity limits and safety measures with patient and family. Provide visual clues: red socks.

## 2020-04-08 NOTE — ED ADULT TRIAGE NOTE - CHIEF COMPLAINT QUOTE
BIBEMS from home as per EMS:" His FS reading is saying HIGH. pt presents lethargic, able to state name , and current location. pt easily falls asleep in triage. room air saturation 100% FS in triage reading HIGH symptoms started since Saturday. pt has no PMH

## 2020-04-08 NOTE — ED PROVIDER NOTE - PHYSICAL EXAMINATION
CONST: Well appearing in NAD  HEAD: NC/AT  EYES: pupils 1 mm bilaterally, Sclera and conjunctiva clear.  NECK: Non-tender, no meningeal signs, supple  CARD: Normal S1 S2; Normal rate and rhythm  RESP: Equal BS B/L, No wheezes, rhonchi or rales. No distress  GI: Soft, non-tender, non-distended.  MS: Normal ROM in all extremities. No edema of lower extremities, no calf pain, radial pulses 2+ bilaterally  SKIN: Warm, dry, no acute rashes. Good turgor  NEURO: A&Ox2, mumbling when questions are answered, not cooperative w/ neuro exam.

## 2020-04-08 NOTE — H&P ADULT - NSHPREVIEWOFSYSTEMS_GEN_ALL_CORE
CONSTITUTIONAL: No weakness, fevers or chills  EYES/ENT: No visual changes;  No vertigo or throat pain   RESPIRATORY: No cough, wheezing, hemoptysis; No shortness of breath  CARDIOVASCULAR: No chest pain or palpitations  GASTROINTESTINAL: No abdominal or epigastric pain. No nausea, vomiting, or hematemesis; No diarrhea or constipation. No melena or hematochezia.  GENITOURINARY: No dysuria, frequency or hematuria  NEUROLOGICAL: No numbness or weakness  SKIN: No itching, rashes CONSTITUTIONAL: Lethargy, MAS  EYES/ENT: Dry mouth  RESPIRATORY: No cough, wheezing, hemoptysis; No shortness of breath  CARDIOVASCULAR: No chest pain or palpitations  GASTROINTESTINAL: No abdominal or epigastric pain. No nausea, vomiting, or hematemesis; No diarrhea or constipation. No melena or hematochezia.  GENITOURINARY: Polyuria  NEUROLOGICAL: No numbness or weakness  SKIN: No itching, rashes

## 2020-04-09 LAB
ALBUMIN SERPL ELPH-MCNC: 4.3 G/DL — SIGNIFICANT CHANGE UP (ref 3.5–5.2)
ALP SERPL-CCNC: 146 U/L — HIGH (ref 30–115)
ALT FLD-CCNC: 60 U/L — HIGH (ref 0–41)
ANION GAP SERPL CALC-SCNC: 15 MMOL/L — HIGH (ref 7–14)
ANION GAP SERPL CALC-SCNC: 15 MMOL/L — HIGH (ref 7–14)
ANION GAP SERPL CALC-SCNC: 16 MMOL/L — HIGH (ref 7–14)
ANION GAP SERPL CALC-SCNC: 18 MMOL/L — HIGH (ref 7–14)
ANION GAP SERPL CALC-SCNC: 18 MMOL/L — HIGH (ref 7–14)
AST SERPL-CCNC: 53 U/L — HIGH (ref 0–41)
B-OH-BUTYR SERPL-SCNC: 0.5 MMOL/L — HIGH
BASOPHILS # BLD AUTO: 0.01 K/UL — SIGNIFICANT CHANGE UP (ref 0–0.2)
BASOPHILS NFR BLD AUTO: 0.1 % — SIGNIFICANT CHANGE UP (ref 0–1)
BILIRUB DIRECT SERPL-MCNC: 0.2 MG/DL — SIGNIFICANT CHANGE UP (ref 0–0.2)
BILIRUB INDIRECT FLD-MCNC: 0.4 MG/DL — SIGNIFICANT CHANGE UP (ref 0.2–1.2)
BILIRUB SERPL-MCNC: 0.6 MG/DL — SIGNIFICANT CHANGE UP (ref 0.2–1.2)
BILIRUB SERPL-MCNC: 0.6 MG/DL — SIGNIFICANT CHANGE UP (ref 0.2–1.2)
BUN SERPL-MCNC: 61 MG/DL — CRITICAL HIGH (ref 10–20)
BUN SERPL-MCNC: 68 MG/DL — CRITICAL HIGH (ref 10–20)
BUN SERPL-MCNC: 73 MG/DL — CRITICAL HIGH (ref 10–20)
BUN SERPL-MCNC: 83 MG/DL — CRITICAL HIGH (ref 10–20)
BUN SERPL-MCNC: 86 MG/DL — CRITICAL HIGH (ref 10–20)
CA-I BLD-SCNC: 1.22 MMOL/L — SIGNIFICANT CHANGE UP (ref 1.12–1.3)
CALCIUM SERPL-MCNC: 9 MG/DL — SIGNIFICANT CHANGE UP (ref 8.5–10.1)
CALCIUM SERPL-MCNC: 9 MG/DL — SIGNIFICANT CHANGE UP (ref 8.5–10.1)
CALCIUM SERPL-MCNC: 9.1 MG/DL — SIGNIFICANT CHANGE UP (ref 8.5–10.1)
CHLORIDE SERPL-SCNC: 101 MMOL/L — SIGNIFICANT CHANGE UP (ref 98–110)
CHLORIDE SERPL-SCNC: 102 MMOL/L — SIGNIFICANT CHANGE UP (ref 98–110)
CHLORIDE SERPL-SCNC: 104 MMOL/L — SIGNIFICANT CHANGE UP (ref 98–110)
CHLORIDE SERPL-SCNC: 104 MMOL/L — SIGNIFICANT CHANGE UP (ref 98–110)
CHLORIDE SERPL-SCNC: 105 MMOL/L — SIGNIFICANT CHANGE UP (ref 98–110)
CK SERPL-CCNC: 1463 U/L — HIGH (ref 0–225)
CO2 SERPL-SCNC: 20 MMOL/L — SIGNIFICANT CHANGE UP (ref 17–32)
CO2 SERPL-SCNC: 22 MMOL/L — SIGNIFICANT CHANGE UP (ref 17–32)
CO2 SERPL-SCNC: 22 MMOL/L — SIGNIFICANT CHANGE UP (ref 17–32)
CO2 SERPL-SCNC: 24 MMOL/L — SIGNIFICANT CHANGE UP (ref 17–32)
CO2 SERPL-SCNC: 24 MMOL/L — SIGNIFICANT CHANGE UP (ref 17–32)
CREAT SERPL-MCNC: 1.4 MG/DL — SIGNIFICANT CHANGE UP (ref 0.7–1.5)
CREAT SERPL-MCNC: 1.4 MG/DL — SIGNIFICANT CHANGE UP (ref 0.7–1.5)
CREAT SERPL-MCNC: 1.5 MG/DL — SIGNIFICANT CHANGE UP (ref 0.7–1.5)
CREAT SERPL-MCNC: 1.7 MG/DL — HIGH (ref 0.7–1.5)
CREAT SERPL-MCNC: 1.8 MG/DL — HIGH (ref 0.7–1.5)
CRP SERPL-MCNC: 1.32 MG/DL — HIGH (ref 0–0.4)
CULTURE RESULTS: SIGNIFICANT CHANGE UP
D DIMER BLD IA.RAPID-MCNC: 657 NG/ML DDU — HIGH (ref 0–230)
EOSINOPHIL # BLD AUTO: 0.01 K/UL — SIGNIFICANT CHANGE UP (ref 0–0.7)
EOSINOPHIL NFR BLD AUTO: 0.1 % — SIGNIFICANT CHANGE UP (ref 0–8)
ESTIMATED AVERAGE GLUCOSE: 381 MG/DL — HIGH (ref 68–114)
GLUCOSE BLDC GLUCOMTR-MCNC: 140 MG/DL — HIGH (ref 70–99)
GLUCOSE BLDC GLUCOMTR-MCNC: 153 MG/DL — HIGH (ref 70–99)
GLUCOSE BLDC GLUCOMTR-MCNC: 201 MG/DL — HIGH (ref 70–99)
GLUCOSE BLDC GLUCOMTR-MCNC: 216 MG/DL — HIGH (ref 70–99)
GLUCOSE BLDC GLUCOMTR-MCNC: 230 MG/DL — HIGH (ref 70–99)
GLUCOSE BLDC GLUCOMTR-MCNC: 231 MG/DL — HIGH (ref 70–99)
GLUCOSE BLDC GLUCOMTR-MCNC: 249 MG/DL — HIGH (ref 70–99)
GLUCOSE BLDC GLUCOMTR-MCNC: 251 MG/DL — HIGH (ref 70–99)
GLUCOSE BLDC GLUCOMTR-MCNC: 252 MG/DL — HIGH (ref 70–99)
GLUCOSE BLDC GLUCOMTR-MCNC: 258 MG/DL — HIGH (ref 70–99)
GLUCOSE BLDC GLUCOMTR-MCNC: 275 MG/DL — HIGH (ref 70–99)
GLUCOSE BLDC GLUCOMTR-MCNC: 285 MG/DL — HIGH (ref 70–99)
GLUCOSE BLDC GLUCOMTR-MCNC: 291 MG/DL — HIGH (ref 70–99)
GLUCOSE BLDC GLUCOMTR-MCNC: 300 MG/DL — HIGH (ref 70–99)
GLUCOSE BLDC GLUCOMTR-MCNC: 303 MG/DL — HIGH (ref 70–99)
GLUCOSE BLDC GLUCOMTR-MCNC: 326 MG/DL — HIGH (ref 70–99)
GLUCOSE BLDC GLUCOMTR-MCNC: 333 MG/DL — HIGH (ref 70–99)
GLUCOSE BLDC GLUCOMTR-MCNC: 362 MG/DL — HIGH (ref 70–99)
GLUCOSE BLDC GLUCOMTR-MCNC: 363 MG/DL — HIGH (ref 70–99)
GLUCOSE BLDC GLUCOMTR-MCNC: >600 MG/DL — CRITICAL HIGH (ref 70–99)
GLUCOSE SERPL-MCNC: 162 MG/DL — HIGH (ref 70–99)
GLUCOSE SERPL-MCNC: 229 MG/DL — HIGH (ref 70–99)
GLUCOSE SERPL-MCNC: 261 MG/DL — HIGH (ref 70–99)
GLUCOSE SERPL-MCNC: 315 MG/DL — HIGH (ref 70–99)
GLUCOSE SERPL-MCNC: 320 MG/DL — HIGH (ref 70–99)
HBA1C BLD-MCNC: 14.9 % — HIGH (ref 4–5.6)
HCT VFR BLD CALC: 48.5 % — SIGNIFICANT CHANGE UP (ref 42–52)
HGB BLD-MCNC: 16.7 G/DL — SIGNIFICANT CHANGE UP (ref 14–18)
IMM GRANULOCYTES NFR BLD AUTO: 0.4 % — HIGH (ref 0.1–0.3)
LDH SERPL L TO P-CCNC: 407 U/L — HIGH (ref 50–242)
LYMPHOCYTES # BLD AUTO: 0.91 K/UL — LOW (ref 1.2–3.4)
LYMPHOCYTES # BLD AUTO: 8.2 % — LOW (ref 20.5–51.1)
MAGNESIUM SERPL-MCNC: 4.2 MG/DL — CRITICAL HIGH (ref 1.8–2.4)
MAGNESIUM SERPL-MCNC: 4.3 MG/DL — CRITICAL HIGH (ref 1.8–2.4)
MCHC RBC-ENTMCNC: 29.7 PG — SIGNIFICANT CHANGE UP (ref 27–31)
MCHC RBC-ENTMCNC: 34.4 G/DL — SIGNIFICANT CHANGE UP (ref 32–37)
MCV RBC AUTO: 86.1 FL — SIGNIFICANT CHANGE UP (ref 80–94)
MONOCYTES # BLD AUTO: 0.91 K/UL — HIGH (ref 0.1–0.6)
MONOCYTES NFR BLD AUTO: 8.2 % — SIGNIFICANT CHANGE UP (ref 1.7–9.3)
NEUTROPHILS # BLD AUTO: 9.26 K/UL — HIGH (ref 1.4–6.5)
NEUTROPHILS NFR BLD AUTO: 83 % — HIGH (ref 42.2–75.2)
NRBC # BLD: 0 /100 WBCS — SIGNIFICANT CHANGE UP (ref 0–0)
PLATELET # BLD AUTO: 192 K/UL — SIGNIFICANT CHANGE UP (ref 130–400)
POTASSIUM SERPL-MCNC: 4 MMOL/L — SIGNIFICANT CHANGE UP (ref 3.5–5)
POTASSIUM SERPL-MCNC: 4.3 MMOL/L — SIGNIFICANT CHANGE UP (ref 3.5–5)
POTASSIUM SERPL-MCNC: 4.3 MMOL/L — SIGNIFICANT CHANGE UP (ref 3.5–5)
POTASSIUM SERPL-MCNC: 4.5 MMOL/L — SIGNIFICANT CHANGE UP (ref 3.5–5)
POTASSIUM SERPL-MCNC: 4.6 MMOL/L — SIGNIFICANT CHANGE UP (ref 3.5–5)
POTASSIUM SERPL-SCNC: 4 MMOL/L — SIGNIFICANT CHANGE UP (ref 3.5–5)
POTASSIUM SERPL-SCNC: 4.3 MMOL/L — SIGNIFICANT CHANGE UP (ref 3.5–5)
POTASSIUM SERPL-SCNC: 4.3 MMOL/L — SIGNIFICANT CHANGE UP (ref 3.5–5)
POTASSIUM SERPL-SCNC: 4.5 MMOL/L — SIGNIFICANT CHANGE UP (ref 3.5–5)
POTASSIUM SERPL-SCNC: 4.6 MMOL/L — SIGNIFICANT CHANGE UP (ref 3.5–5)
PROT SERPL-MCNC: 8.1 G/DL — HIGH (ref 6–8)
RBC # BLD: 5.63 M/UL — SIGNIFICANT CHANGE UP (ref 4.7–6.1)
RBC # FLD: 12.2 % — SIGNIFICANT CHANGE UP (ref 11.5–14.5)
SODIUM SERPL-SCNC: 140 MMOL/L — SIGNIFICANT CHANGE UP (ref 135–146)
SODIUM SERPL-SCNC: 141 MMOL/L — SIGNIFICANT CHANGE UP (ref 135–146)
SODIUM SERPL-SCNC: 143 MMOL/L — SIGNIFICANT CHANGE UP (ref 135–146)
SPECIMEN SOURCE: SIGNIFICANT CHANGE UP
WBC # BLD: 11.15 K/UL — HIGH (ref 4.8–10.8)
WBC # FLD AUTO: 11.15 K/UL — HIGH (ref 4.8–10.8)

## 2020-04-09 PROCEDURE — 71045 X-RAY EXAM CHEST 1 VIEW: CPT | Mod: 26

## 2020-04-09 RX ORDER — SODIUM CHLORIDE 9 MG/ML
1000 INJECTION, SOLUTION INTRAVENOUS
Refills: 0 | Status: DISCONTINUED | OUTPATIENT
Start: 2020-04-09 | End: 2020-04-09

## 2020-04-09 RX ORDER — HALOPERIDOL DECANOATE 100 MG/ML
2 INJECTION INTRAMUSCULAR ONCE
Refills: 0 | Status: DISCONTINUED | OUTPATIENT
Start: 2020-04-09 | End: 2020-04-09

## 2020-04-09 RX ORDER — INSULIN HUMAN 100 [IU]/ML
16 INJECTION, SOLUTION SUBCUTANEOUS
Qty: 100 | Refills: 0 | Status: DISCONTINUED | OUTPATIENT
Start: 2020-04-09 | End: 2020-04-09

## 2020-04-09 RX ORDER — INSULIN HUMAN 100 [IU]/ML
16 INJECTION, SOLUTION SUBCUTANEOUS ONCE
Refills: 0 | Status: DISCONTINUED | OUTPATIENT
Start: 2020-04-09 | End: 2020-04-09

## 2020-04-09 RX ORDER — HALOPERIDOL DECANOATE 100 MG/ML
2 INJECTION INTRAMUSCULAR ONCE
Refills: 0 | Status: COMPLETED | OUTPATIENT
Start: 2020-04-09 | End: 2020-04-09

## 2020-04-09 RX ORDER — DEXTROSE MONOHYDRATE, SODIUM CHLORIDE, AND POTASSIUM CHLORIDE 50; .745; 4.5 G/1000ML; G/1000ML; G/1000ML
1000 INJECTION, SOLUTION INTRAVENOUS
Refills: 0 | Status: DISCONTINUED | OUTPATIENT
Start: 2020-04-09 | End: 2020-04-11

## 2020-04-09 RX ORDER — INSULIN HUMAN 100 [IU]/ML
18 INJECTION, SOLUTION SUBCUTANEOUS
Qty: 100 | Refills: 0 | Status: DISCONTINUED | OUTPATIENT
Start: 2020-04-09 | End: 2020-04-10

## 2020-04-09 RX ORDER — SODIUM CHLORIDE 9 MG/ML
1000 INJECTION, SOLUTION INTRAVENOUS
Refills: 0 | Status: DISCONTINUED | OUTPATIENT
Start: 2020-04-09 | End: 2020-04-10

## 2020-04-09 RX ORDER — DEXTROSE MONOHYDRATE, SODIUM CHLORIDE, AND POTASSIUM CHLORIDE 50; .745; 4.5 G/1000ML; G/1000ML; G/1000ML
1000 INJECTION, SOLUTION INTRAVENOUS
Refills: 0 | Status: DISCONTINUED | OUTPATIENT
Start: 2020-04-09 | End: 2020-04-09

## 2020-04-09 RX ADMIN — HEPARIN SODIUM 5000 UNIT(S): 5000 INJECTION INTRAVENOUS; SUBCUTANEOUS at 12:25

## 2020-04-09 RX ADMIN — PANTOPRAZOLE SODIUM 40 MILLIGRAM(S): 20 TABLET, DELAYED RELEASE ORAL at 05:25

## 2020-04-09 RX ADMIN — HEPARIN SODIUM 5000 UNIT(S): 5000 INJECTION INTRAVENOUS; SUBCUTANEOUS at 05:25

## 2020-04-09 RX ADMIN — SODIUM CHLORIDE 150 MILLILITER(S): 9 INJECTION, SOLUTION INTRAVENOUS at 01:02

## 2020-04-09 RX ADMIN — HEPARIN SODIUM 5000 UNIT(S): 5000 INJECTION INTRAVENOUS; SUBCUTANEOUS at 22:44

## 2020-04-09 RX ADMIN — SODIUM CHLORIDE 150 MILLILITER(S): 9 INJECTION, SOLUTION INTRAVENOUS at 12:00

## 2020-04-09 RX ADMIN — HALOPERIDOL DECANOATE 2 MILLIGRAM(S): 100 INJECTION INTRAMUSCULAR at 12:26

## 2020-04-09 RX ADMIN — INSULIN HUMAN 16 UNIT(S)/HR: 100 INJECTION, SOLUTION SUBCUTANEOUS at 10:26

## 2020-04-09 RX ADMIN — DEXTROSE MONOHYDRATE, SODIUM CHLORIDE, AND POTASSIUM CHLORIDE 100 MILLILITER(S): 50; .745; 4.5 INJECTION, SOLUTION INTRAVENOUS at 17:15

## 2020-04-09 RX ADMIN — SODIUM CHLORIDE 150 MILLILITER(S): 9 INJECTION, SOLUTION INTRAVENOUS at 09:27

## 2020-04-09 RX ADMIN — CHLORHEXIDINE GLUCONATE 1 APPLICATION(S): 213 SOLUTION TOPICAL at 05:25

## 2020-04-09 NOTE — CONSULT NOTE ADULT - SUBJECTIVE AND OBJECTIVE BOX
Patient is a 44y old  Male who presents with a chief complaint of DKA (2020 21:44)        SUBJECTIVE: 44y Male with history as below admitted with new onset DM/DKA associated with elevated LFTs and lipase.  Pt has had ~ 2 weeks of progressive lethargy, polyuria, polydipsia, incontinence.  BS > 800 on admission; has improved with IV hydration and IV insulin drip.        REVIEW OF SYSTEMS:  See HPI    PHYSICAL EXAM  Vital Signs Last 24 Hrs  T(C): 36.1 (2020 05:23), Max: 37 (2020 08:36)  T(F): 96.9 (2020 05:23), Max: 98.6 (2020 08:36)  HR: 80 (2020 05:23) (77 - 88)  BP: 112/86 (2020 05:23) (94/61 - 127/69)  BP(mean): --  RR: 17 (2020 05:23) (16 - 18)  SpO2: 97% (2020 05:23) (96% - 100%)    General: Lethargic  HEENT: ELYSIA               Lymphatic system: No Cervical LN    Respiratory: Solitario BS  Cardiovascular: Regular  Gastrointestinal: Soft. + BS  Musculoskeletal: No clubbing.      Skin: Warm.  Intact  Neurological: Non focal      20 @ 07:01  -  20 @ 07:00  --------------------------------------------------------  IN:    dextrose 5% + sodium chloride 0.45%.: 450 mL    dextrose 5% + sodium chloride 0.45%.: 150 mL    insulin regular Infusion: 151 mL    sodium chloride 0.9%: 450 mL    sodium chloride 0.9%: 300 mL  Total IN: 1501 mL    OUT:    Indwelling Catheter - Urethral: 1095 mL  Total OUT: 1095 mL    Total NET: 406 mL          LABS:                          16.7   11.15 )-----------( 192      ( 2020 06:00 )             48.5                                               04-09    140  |  102  |  83<HH>  ----------------------------<  229<H>  4.6   |  20  |  1.7<H>    Ca    9.1      2020 06:00  Mg     4.3     04-09    TPro  8.1<H>  /  Alb  4.3  /  TBili  0.6  /  DBili  0.2  /  AST  53<H>  /  ALT  60<H>  /  AlkPhos  146<H>  04-09                                             Urinalysis Basic - ( 2020 08:54 )    Color: Light Yellow / Appearance: Clear / S.022 / pH: x  Gluc: x / Ketone: Moderate  / Bili: Negative / Urobili: <2 mg/dL   Blood: x / Protein: Trace / Nitrite: Negative   Leuk Esterase: Negative / RBC: 5 /HPF / WBC 2 /HPF   Sq Epi: x / Non Sq Epi: 1 /HPF / Bacteria: Negative        CARDIAC MARKERS ( 2020 06:00 )  x     / x     / 1463 U/L / x     / x      CARDIAC MARKERS ( 2020 16:04 )  x     / x     / 2387 U/L / x     / x      CARDIAC MARKERS ( 2020 08:54 )  x     / <0.01 ng/mL / 2907 U/L / x     / x                                                LIVER FUNCTIONS - ( 2020 06:00 )  Alb: 4.3 g/dL / Pro: 8.1 g/dL / ALK PHOS: 146 U/L / ALT: 60 U/L / AST: 53 U/L / GGT: x                                                                                              MEDICATIONS  (STANDING):  chlorhexidine 4% Liquid 1 Application(s) Topical <User Schedule>  dextrose 5% + sodium chloride 0.45%. 1000 milliLiter(s) (150 mL/Hr) IV Continuous <Continuous>  heparin  Injectable 5000 Unit(s) SubCutaneous every 8 hours  insulin regular Infusion 10 Unit(s)/Hr (10 mL/Hr) IV Continuous <Continuous>  pantoprazole    Tablet 40 milliGRAM(s) Oral before breakfast    MEDICATIONS  (PRN): Patient is a 44y old  Male who presents with a chief complaint of altered MS  (2020 21:44)        SUBJECTIVE: 44y Male with history as below admitted with new onset DM/DKA associated with elevated LFTs and lipase.  Pt has had ~ 2 weeks of progressive lethargy, polyuria, polydipsia, incontinence.  BS > 800 on admission; has improved with IV hydration and IV insulin drip. Admitted to MICU, still on IVF, insulin drip        REVIEW OF SYSTEMS:  See HPI    PHYSICAL EXAM  Vital Signs Last 24 Hrs  T(C): 36.1 (2020 05:23), Max: 37 (2020 08:36)  T(F): 96.9 (2020 05:23), Max: 98.6 (2020 08:36)  HR: 80 (2020 05:23) (77 - 88)  BP: 112/86 (2020 05:23) (94/61 - 127/69)  BP(mean): --  RR: 17 (2020 05:23) (16 - 18)  SpO2: 97% (2020 05:23) (96% - 100%)    General: Lethargic, dehydrated  HEENT: ELYSIA               Lymphatic system: No Cervical LN    Respiratory: Solitario BS, dec both bases  Cardiovascular: Regular, LEVON 3/6  Gastrointestinal: Soft. + BS  Musculoskeletal: No clubbing.      Skin: Warm.  Intact  Neurological: awake, not following commands      20 @ 07:01  -  20 @ 07:00  --------------------------------------------------------  IN:    dextrose 5% + sodium chloride 0.45%.: 450 mL    dextrose 5% + sodium chloride 0.45%.: 150 mL    insulin regular Infusion: 151 mL    sodium chloride 0.9%: 450 mL    sodium chloride 0.9%: 300 mL  Total IN: 1501 mL    OUT:    Indwelling Catheter - Urethral: 1095 mL  Total OUT: 1095 mL    Total NET: 406 mL          LABS:                          16.7   11.15 )-----------( 192      ( 2020 06:00 )             48.5                                               04    140  |  102  |  83<HH>  ----------------------------<  229<H>  4.6   |  20  |  1.7<H>    Ca    9.1      2020 06:00  Mg     4.3         TPro  8.1<H>  /  Alb  4.3  /  TBili  0.6  /  DBili  0.2  /  AST  53<H>  /  ALT  60<H>  /  AlkPhos  146<H>                                               Urinalysis Basic - ( 2020 08:54 )    Color: Light Yellow / Appearance: Clear / S.022 / pH: x  Gluc: x / Ketone: Moderate  / Bili: Negative / Urobili: <2 mg/dL   Blood: x / Protein: Trace / Nitrite: Negative   Leuk Esterase: Negative / RBC: 5 /HPF / WBC 2 /HPF   Sq Epi: x / Non Sq Epi: 1 /HPF / Bacteria: Negative        CARDIAC MARKERS ( 2020 06:00 )  x     / x     / 1463 U/L / x     / x      CARDIAC MARKERS ( 2020 16:04 )  x     / x     / 2387 U/L / x     / x      CARDIAC MARKERS ( 2020 08:54 )  x     / <0.01 ng/mL / 2907 U/L / x     / x                                                LIVER FUNCTIONS - ( 2020 06:00 )  Alb: 4.3 g/dL / Pro: 8.1 g/dL / ALK PHOS: 146 U/L / ALT: 60 U/L / AST: 53 U/L / GGT: x                                                                                              MEDICATIONS  (STANDING):  chlorhexidine 4% Liquid 1 Application(s) Topical <User Schedule>  dextrose 5% + sodium chloride 0.45%. 1000 milliLiter(s) (150 mL/Hr) IV Continuous <Continuous>  heparin  Injectable 5000 Unit(s) SubCutaneous every 8 hours  insulin regular Infusion 10 Unit(s)/Hr (10 mL/Hr) IV Continuous <Continuous>  pantoprazole    Tablet 40 milliGRAM(s) Oral before breakfast    MEDICATIONS  (PRN):

## 2020-04-09 NOTE — PROGRESS NOTE ADULT - SUBJECTIVE AND OBJECTIVE BOX
Remains with anion gap acidosis; BS > 200.  Maintain insulin drip and titrate up to achieve BS < 160

## 2020-04-09 NOTE — CHART NOTE - NSCHARTNOTEFT_GEN_A_CORE
TRANSFER TO NON-COVID ICU    44 year old male with hx of autism spectrum presented with AMS secondary to DKA (no history recorded) with glucose >600, AG 34, bicarb 8, BHOB 7.3 with improving gap on insulin drip and fluids. TRANSFER TO NON-COVID ICU    44 year old male with hx of autism spectrum presented with AMS secondary to DKA (no history recorded) with glucose >600, AG 34, bicarb 8, BHOB 7.3 with improving gap from 34 to 18 on insulin drip and fluids.    ENDOCRINE  # DKA  - no signs of infection and COVID negative / no signs of pancreatitis / f/u UDS  - on insulin drip and fluids with improvement in gap from 34 to 18  - currently still NPO and on D5 1/2 NS at 150 mL/hr with insulin drip at 16 units/hr  - UO > 50 mL/hr  - labs ordered as per endo, f/u    NEUROLOGICAL  # Agitation  - unclear if at baseline, however needs haldol as needed to avoid pulling lines    RENAL/URO  # Likely prerenal GISELA on CKD  - creatinine 2.6 on admission currently 1.7, unknown baseline however improving with fluids // UO > 50 mL/hr  - on floyd    # Hypermagnesemia  - 4.3 today, unclear why as patient is not receiving any mag and having good UO    CARDIOLOGY  -no known issues    PULMONARY   -no known issues, COVID negative    GI  -no known issues    PLAN: f/u bmps every round for closing gap, ordered for today, f/u magnesium    # DVT PPX: heparin sc 5000 q8h  # GI PPX: not indicated  # Diet: currently NPO  FULL CODE TRANSFER TO NON-COVID ICU    44 year old male with hx of autism spectrum presented with AMS secondary to DKA (no history recorded) with glucose >600, AG 34, bicarb 8, BHOB 7.3 with improving gap from 34 to 18 on insulin drip and fluids.    ENDOCRINE  # DKA  - no signs of infection and COVID negative / no signs of pancreatitis / f/u UDS  - on insulin drip and fluids with improvement in gap from 34 to 18  - currently still NPO and on D5 1/2 NS at 150 mL/hr with insulin drip at 16 units/hr  - UO > 50 mL/hr  - labs ordered as per endo, f/u    NEUROLOGICAL  # Agitation  - unclear if at baseline, however needs haldol as needed to avoid pulling lines    RENAL/URO  # Likely prerenal GISELA on CKD  - creatinine 2.6 on admission currently 1.7, unknown baseline however improving with fluids // UO > 50 mL/hr  - on floyd    # Hypermagnesemia  - 4.3 today, unclear why as patient is not receiving any mag and having good UO  - f/u mag on fluids    CARDIOLOGY  -no known issues    PULMONARY   -no known issues, COVID negative    GI  -no known issues    PLAN: f/u bmps every round for closing gap, ordered for today, f/u magnesium    # DVT PPX: heparin sc 5000 q8h  # GI PPX: not indicated  # Diet: currently NPO  FULL CODE

## 2020-04-09 NOTE — CONSULT NOTE ADULT - ASSESSMENT
IMPRESSION:  DKA  HO autism disorder  GISELA: Prerenal      CNS: No depressants. Avoid Benzo.    HEENT: Oral care. Aspiration precaution.    PULMONARY:  HOB @ 45 degrees.     CARDIOVASCULAR: Continue with iv fluid for now.     GI: GI prophylaxis.  Feeding as tolerated    RENAL:  Follow up lytes.  Correct as needed. Repeat BMP.     INFECTIOUS DISEASE: Follow up cultures    HEMATOLOGICAL:  DVT prophylaxis.    ENDOCRINE:  Follow up FS.  Insulin drip for now. Endocrine follow up.     MUSCULOSKELETAL: Out of bed to chair. IMPRESSION:    DKA  HO autism disorder  GISELA: Prerenal      CNS: No depressants. Avoid Benzo.    HEENT: Oral care. Aspiration precaution.    PULMONARY:  HOB @ 45 degrees.     CARDIOVASCULAR: Continue with iv fluid for now.     GI: GI prophylaxis.  Feeding as tolerated    RENAL:  Follow up lytes.  Correct as needed. Repeat CMP.     INFECTIOUS DISEASE: Follow up cultures    HEMATOLOGICAL:  DVT prophylaxis.    ENDOCRINE:  Follow up FS.  Insulin drip for now. Endocrine follow up.     MUSCULOSKELETAL: Out of bed to chair.    Transfer to non COVID area

## 2020-04-10 LAB
-  CANDIDA ALBICANS: SIGNIFICANT CHANGE UP
-  CANDIDA GLABRATA: SIGNIFICANT CHANGE UP
-  CANDIDA KRUSEI: SIGNIFICANT CHANGE UP
-  CANDIDA PARAPSILOSIS: SIGNIFICANT CHANGE UP
-  CANDIDA TROPICALIS: SIGNIFICANT CHANGE UP
-  COAGULASE NEGATIVE STAPHYLOCOCCUS: SIGNIFICANT CHANGE UP
-  K. PNEUMONIAE GROUP: SIGNIFICANT CHANGE UP
-  KPC RESISTANCE GENE: SIGNIFICANT CHANGE UP
-  STREPTOCOCCUS SP. (NOT GRP A, B OR S PNEUMONIAE): SIGNIFICANT CHANGE UP
A BAUMANNII DNA SPEC QL NAA+PROBE: SIGNIFICANT CHANGE UP
ALBUMIN SERPL ELPH-MCNC: 3.7 G/DL — SIGNIFICANT CHANGE UP (ref 3.5–5.2)
ALP SERPL-CCNC: 115 U/L — SIGNIFICANT CHANGE UP (ref 30–115)
ALT FLD-CCNC: 54 U/L — HIGH (ref 0–41)
ANION GAP SERPL CALC-SCNC: 13 MMOL/L — SIGNIFICANT CHANGE UP (ref 7–14)
ANION GAP SERPL CALC-SCNC: 14 MMOL/L — SIGNIFICANT CHANGE UP (ref 7–14)
AST SERPL-CCNC: 64 U/L — HIGH (ref 0–41)
BASOPHILS # BLD AUTO: 0.01 K/UL — SIGNIFICANT CHANGE UP (ref 0–0.2)
BASOPHILS NFR BLD AUTO: 0.1 % — SIGNIFICANT CHANGE UP (ref 0–1)
BILIRUB SERPL-MCNC: 0.5 MG/DL — SIGNIFICANT CHANGE UP (ref 0.2–1.2)
BUN SERPL-MCNC: 46 MG/DL — HIGH (ref 10–20)
BUN SERPL-MCNC: 52 MG/DL — HIGH (ref 10–20)
CALCIUM SERPL-MCNC: 8.1 MG/DL — LOW (ref 8.5–10.1)
CALCIUM SERPL-MCNC: 8.7 MG/DL — SIGNIFICANT CHANGE UP (ref 8.5–10.1)
CHLORIDE SERPL-SCNC: 103 MMOL/L — SIGNIFICANT CHANGE UP (ref 98–110)
CHLORIDE SERPL-SCNC: 106 MMOL/L — SIGNIFICANT CHANGE UP (ref 98–110)
CO2 SERPL-SCNC: 23 MMOL/L — SIGNIFICANT CHANGE UP (ref 17–32)
CO2 SERPL-SCNC: 24 MMOL/L — SIGNIFICANT CHANGE UP (ref 17–32)
CREAT SERPL-MCNC: 1.2 MG/DL — SIGNIFICANT CHANGE UP (ref 0.7–1.5)
CREAT SERPL-MCNC: 1.2 MG/DL — SIGNIFICANT CHANGE UP (ref 0.7–1.5)
E CLOAC COMP DNA BLD POS QL NAA+PROBE: SIGNIFICANT CHANGE UP
E COLI DNA BLD POS QL NAA+NON-PROBE: SIGNIFICANT CHANGE UP
ENTEROCOC DNA BLD POS QL NAA+NON-PROBE: SIGNIFICANT CHANGE UP
ENTEROCOC DNA BLD POS QL NAA+NON-PROBE: SIGNIFICANT CHANGE UP
EOSINOPHIL # BLD AUTO: 0.02 K/UL — SIGNIFICANT CHANGE UP (ref 0–0.7)
EOSINOPHIL NFR BLD AUTO: 0.2 % — SIGNIFICANT CHANGE UP (ref 0–8)
GLUCOSE BLDC GLUCOMTR-MCNC: 130 MG/DL — HIGH (ref 70–99)
GLUCOSE BLDC GLUCOMTR-MCNC: 158 MG/DL — HIGH (ref 70–99)
GLUCOSE BLDC GLUCOMTR-MCNC: 158 MG/DL — HIGH (ref 70–99)
GLUCOSE BLDC GLUCOMTR-MCNC: 178 MG/DL — HIGH (ref 70–99)
GLUCOSE BLDC GLUCOMTR-MCNC: 208 MG/DL — HIGH (ref 70–99)
GLUCOSE BLDC GLUCOMTR-MCNC: 220 MG/DL — HIGH (ref 70–99)
GLUCOSE BLDC GLUCOMTR-MCNC: 225 MG/DL — HIGH (ref 70–99)
GLUCOSE BLDC GLUCOMTR-MCNC: 231 MG/DL — HIGH (ref 70–99)
GLUCOSE BLDC GLUCOMTR-MCNC: 305 MG/DL — HIGH (ref 70–99)
GLUCOSE BLDC GLUCOMTR-MCNC: 380 MG/DL — HIGH (ref 70–99)
GLUCOSE BLDC GLUCOMTR-MCNC: 403 MG/DL — HIGH (ref 70–99)
GLUCOSE BLDC GLUCOMTR-MCNC: 407 MG/DL — HIGH (ref 70–99)
GLUCOSE BLDC GLUCOMTR-MCNC: 449 MG/DL — HIGH (ref 70–99)
GLUCOSE BLDC GLUCOMTR-MCNC: 62 MG/DL — LOW (ref 70–99)
GLUCOSE SERPL-MCNC: 106 MG/DL — HIGH (ref 70–99)
GLUCOSE SERPL-MCNC: 174 MG/DL — HIGH (ref 70–99)
GP B STREP DNA BLD POS QL NAA+NON-PROBE: SIGNIFICANT CHANGE UP
GRAM STN FLD: SIGNIFICANT CHANGE UP
HAEM INFLU DNA BLD POS QL NAA+NON-PROBE: SIGNIFICANT CHANGE UP
HCT VFR BLD CALC: 44.9 % — SIGNIFICANT CHANGE UP (ref 42–52)
HGB BLD-MCNC: 14.8 G/DL — SIGNIFICANT CHANGE UP (ref 14–18)
IMM GRANULOCYTES NFR BLD AUTO: 0.6 % — HIGH (ref 0.1–0.3)
K OXYTOCA DNA BLD POS QL NAA+NON-PROBE: SIGNIFICANT CHANGE UP
L MONOCYTOG DNA BLD POS QL NAA+NON-PROBE: SIGNIFICANT CHANGE UP
LYMPHOCYTES # BLD AUTO: 1.57 K/UL — SIGNIFICANT CHANGE UP (ref 1.2–3.4)
LYMPHOCYTES # BLD AUTO: 15.7 % — LOW (ref 20.5–51.1)
MAGNESIUM SERPL-MCNC: 3.5 MG/DL — CRITICAL HIGH (ref 1.8–2.4)
MCHC RBC-ENTMCNC: 29.1 PG — SIGNIFICANT CHANGE UP (ref 27–31)
MCHC RBC-ENTMCNC: 33 G/DL — SIGNIFICANT CHANGE UP (ref 32–37)
MCV RBC AUTO: 88.4 FL — SIGNIFICANT CHANGE UP (ref 80–94)
METHOD TYPE: SIGNIFICANT CHANGE UP
MONOCYTES # BLD AUTO: 0.94 K/UL — HIGH (ref 0.1–0.6)
MONOCYTES NFR BLD AUTO: 9.4 % — HIGH (ref 1.7–9.3)
MRSA SPEC QL CULT: SIGNIFICANT CHANGE UP
MSSA DNA SPEC QL NAA+PROBE: SIGNIFICANT CHANGE UP
N MEN ISLT CULT: SIGNIFICANT CHANGE UP
NEUTROPHILS # BLD AUTO: 7.42 K/UL — HIGH (ref 1.4–6.5)
NEUTROPHILS NFR BLD AUTO: 74 % — SIGNIFICANT CHANGE UP (ref 42.2–75.2)
NRBC # BLD: 0 /100 WBCS — SIGNIFICANT CHANGE UP (ref 0–0)
P AERUGINOSA DNA BLD POS NAA+NON-PROBE: SIGNIFICANT CHANGE UP
PLATELET # BLD AUTO: 137 K/UL — SIGNIFICANT CHANGE UP (ref 130–400)
POTASSIUM SERPL-MCNC: 4.1 MMOL/L — SIGNIFICANT CHANGE UP (ref 3.5–5)
POTASSIUM SERPL-MCNC: 5.8 MMOL/L — HIGH (ref 3.5–5)
POTASSIUM SERPL-SCNC: 4.1 MMOL/L — SIGNIFICANT CHANGE UP (ref 3.5–5)
POTASSIUM SERPL-SCNC: 5.8 MMOL/L — HIGH (ref 3.5–5)
PROCALCITONIN SERPL-MCNC: 0.39 NG/ML — HIGH (ref 0.02–0.1)
PROT SERPL-MCNC: 7.1 G/DL — SIGNIFICANT CHANGE UP (ref 6–8)
RBC # BLD: 5.08 M/UL — SIGNIFICANT CHANGE UP (ref 4.7–6.1)
RBC # FLD: 12.2 % — SIGNIFICANT CHANGE UP (ref 11.5–14.5)
S MARCESCENS DNA BLD POS NAA+NON-PROBE: SIGNIFICANT CHANGE UP
S PNEUM DNA BLD POS QL NAA+NON-PROBE: SIGNIFICANT CHANGE UP
S PYO DNA BLD POS QL NAA+NON-PROBE: SIGNIFICANT CHANGE UP
SODIUM SERPL-SCNC: 140 MMOL/L — SIGNIFICANT CHANGE UP (ref 135–146)
SODIUM SERPL-SCNC: 143 MMOL/L — SIGNIFICANT CHANGE UP (ref 135–146)
WBC # BLD: 10.02 K/UL — SIGNIFICANT CHANGE UP (ref 4.8–10.8)
WBC # FLD AUTO: 10.02 K/UL — SIGNIFICANT CHANGE UP (ref 4.8–10.8)

## 2020-04-10 PROCEDURE — 76770 US EXAM ABDO BACK WALL COMP: CPT | Mod: 26

## 2020-04-10 PROCEDURE — 99222 1ST HOSP IP/OBS MODERATE 55: CPT

## 2020-04-10 RX ORDER — INSULIN LISPRO 100/ML
VIAL (ML) SUBCUTANEOUS
Refills: 0 | Status: DISCONTINUED | OUTPATIENT
Start: 2020-04-10 | End: 2020-04-10

## 2020-04-10 RX ORDER — INSULIN LISPRO 100/ML
10 VIAL (ML) SUBCUTANEOUS
Refills: 0 | Status: DISCONTINUED | OUTPATIENT
Start: 2020-04-10 | End: 2020-04-11

## 2020-04-10 RX ORDER — INSULIN LISPRO 100/ML
VIAL (ML) SUBCUTANEOUS
Refills: 0 | Status: DISCONTINUED | OUTPATIENT
Start: 2020-04-10 | End: 2020-04-11

## 2020-04-10 RX ORDER — INSULIN HUMAN 100 [IU]/ML
10 INJECTION, SOLUTION SUBCUTANEOUS ONCE
Refills: 0 | Status: COMPLETED | OUTPATIENT
Start: 2020-04-10 | End: 2020-04-10

## 2020-04-10 RX ORDER — SODIUM CHLORIDE 9 MG/ML
1000 INJECTION, SOLUTION INTRAVENOUS
Refills: 0 | Status: DISCONTINUED | OUTPATIENT
Start: 2020-04-10 | End: 2020-04-10

## 2020-04-10 RX ORDER — INSULIN LISPRO 100/ML
8 VIAL (ML) SUBCUTANEOUS
Refills: 0 | Status: DISCONTINUED | OUTPATIENT
Start: 2020-04-10 | End: 2020-04-10

## 2020-04-10 RX ORDER — INSULIN GLARGINE 100 [IU]/ML
25 INJECTION, SOLUTION SUBCUTANEOUS EVERY MORNING
Refills: 0 | Status: DISCONTINUED | OUTPATIENT
Start: 2020-04-10 | End: 2020-04-10

## 2020-04-10 RX ORDER — INSULIN LISPRO 100/ML
6 VIAL (ML) SUBCUTANEOUS ONCE
Refills: 0 | Status: COMPLETED | OUTPATIENT
Start: 2020-04-10 | End: 2020-04-10

## 2020-04-10 RX ORDER — INSULIN LISPRO 100/ML
10 VIAL (ML) SUBCUTANEOUS ONCE
Refills: 0 | Status: COMPLETED | OUTPATIENT
Start: 2020-04-10 | End: 2020-04-10

## 2020-04-10 RX ORDER — CEFTRIAXONE 500 MG/1
2000 INJECTION, POWDER, FOR SOLUTION INTRAMUSCULAR; INTRAVENOUS EVERY 24 HOURS
Refills: 0 | Status: DISCONTINUED | OUTPATIENT
Start: 2020-04-10 | End: 2020-04-11

## 2020-04-10 RX ORDER — INSULIN GLARGINE 100 [IU]/ML
10 INJECTION, SOLUTION SUBCUTANEOUS ONCE
Refills: 0 | Status: COMPLETED | OUTPATIENT
Start: 2020-04-10 | End: 2020-04-10

## 2020-04-10 RX ORDER — INSULIN HUMAN 100 [IU]/ML
9 INJECTION, SOLUTION SUBCUTANEOUS
Qty: 100 | Refills: 0 | Status: DISCONTINUED | OUTPATIENT
Start: 2020-04-10 | End: 2020-04-10

## 2020-04-10 RX ORDER — INSULIN GLARGINE 100 [IU]/ML
35 INJECTION, SOLUTION SUBCUTANEOUS EVERY MORNING
Refills: 0 | Status: DISCONTINUED | OUTPATIENT
Start: 2020-04-10 | End: 2020-04-11

## 2020-04-10 RX ADMIN — HEPARIN SODIUM 5000 UNIT(S): 5000 INJECTION INTRAVENOUS; SUBCUTANEOUS at 21:27

## 2020-04-10 RX ADMIN — Medication 10 UNIT(S): at 17:47

## 2020-04-10 RX ADMIN — Medication 10 UNIT(S): at 17:45

## 2020-04-10 RX ADMIN — Medication 12: at 13:06

## 2020-04-10 RX ADMIN — Medication 12: at 17:46

## 2020-04-10 RX ADMIN — INSULIN GLARGINE 25 UNIT(S): 100 INJECTION, SOLUTION SUBCUTANEOUS at 06:51

## 2020-04-10 RX ADMIN — CEFTRIAXONE 100 MILLIGRAM(S): 500 INJECTION, POWDER, FOR SOLUTION INTRAMUSCULAR; INTRAVENOUS at 06:08

## 2020-04-10 RX ADMIN — HEPARIN SODIUM 5000 UNIT(S): 5000 INJECTION INTRAVENOUS; SUBCUTANEOUS at 15:09

## 2020-04-10 RX ADMIN — HEPARIN SODIUM 5000 UNIT(S): 5000 INJECTION INTRAVENOUS; SUBCUTANEOUS at 05:36

## 2020-04-10 RX ADMIN — CHLORHEXIDINE GLUCONATE 1 APPLICATION(S): 213 SOLUTION TOPICAL at 05:36

## 2020-04-10 RX ADMIN — SODIUM CHLORIDE 50 MILLILITER(S): 9 INJECTION, SOLUTION INTRAVENOUS at 06:08

## 2020-04-10 RX ADMIN — INSULIN HUMAN 18 UNIT(S)/HR: 100 INJECTION, SOLUTION SUBCUTANEOUS at 03:24

## 2020-04-10 RX ADMIN — DEXTROSE MONOHYDRATE, SODIUM CHLORIDE, AND POTASSIUM CHLORIDE 100 MILLILITER(S): 50; .745; 4.5 INJECTION, SOLUTION INTRAVENOUS at 06:08

## 2020-04-10 RX ADMIN — Medication 6 UNIT(S): at 20:53

## 2020-04-10 RX ADMIN — INSULIN HUMAN 10 UNIT(S): 100 INJECTION, SOLUTION SUBCUTANEOUS at 14:08

## 2020-04-10 RX ADMIN — INSULIN HUMAN 9 UNIT(S)/HR: 100 INJECTION, SOLUTION SUBCUTANEOUS at 07:00

## 2020-04-10 NOTE — CHART NOTE - NSCHARTNOTEFT_GEN_A_CORE
Patient's anion gap has closed. Fingersticks ~ 200s. Patient is on D5% @ 40 ml/hr  will decrease the insulin infusion rate to half i.e 9u/hr right now.   will start patient on basal-bolus insulin.   Diet has been advanced, will encourage pt to take PO diet.  Insulin infusion and D5% can be DC'd as per AM labs.

## 2020-04-10 NOTE — PROGRESS NOTE ADULT - ASSESSMENT
44 year old male with hx of autism spectrum presented with AMS secondary to DKA (no history recorded) with glucose >600, AG 34, bicarb 8, BHOB 7.3 with improving gap from 34 to 18 on insulin drip and fluids.    ENDOCRINE  # DKA  - no signs of infection and COVID negative / no signs of pancreatitis / f/u UDS  -gap closed  - d/c drip  - UO > 50 mL/hr  - labs ordered as per endo, f/u    NEUROLOGICAL  # Agitation possibly secondary to sepsis  - unclear if at baseline, however needs haldol as needed to avoid pulling lines    RENAL/URO  # Likely prerenal GISELA on CKD  - creatinine 2.6 on admission currently 1.7, unknown baseline however improving with fluids // UO > 50 mL/hr  - on floyd    # Hypermagnesemia  - 4.3 today, unclear why as patient is not receiving any mag and having good UO  - f/u mag on fluids    CARDIOLOGY  -no known issues    PULMONARY   -no known issues, COVID negative    GI  -no known issues    PLAN: possible DG to medical floor in pm    # DVT PPX: heparin sc 5000 q8h  # GI PPX: not indicated  # Diet: currently NPO  FULL CODE.

## 2020-04-10 NOTE — PROGRESS NOTE ADULT - SUBJECTIVE AND OBJECTIVE BOX
SUBJECTIVE:    Patient is a 44y old Male who presents with a chief complaint of DKA (09 Apr 2020 12:51)    Currently admitted to medicine with the primary diagnosis of DKA (diabetic ketoacidoses)     Today is hospital day 2d. This morning he is resting comfortably in bed and reports no new issues or overnight events.     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     ALLERGIES:  No Known Allergies    MEDICATIONS:  STANDING MEDICATIONS  cefTRIAXone   IVPB 2000 milliGRAM(s) IV Intermittent every 24 hours  chlorhexidine 4% Liquid 1 Application(s) Topical <User Schedule>  dextrose 5%. 1000 milliLiter(s) IV Continuous <Continuous>  heparin  Injectable 5000 Unit(s) SubCutaneous every 8 hours  insulin glargine Injectable (LANTUS) 25 Unit(s) SubCutaneous every morning  insulin lispro (HumaLOG) corrective regimen sliding scale   SubCutaneous three times a day before meals  insulin lispro Injectable (HumaLOG) 8 Unit(s) SubCutaneous three times a day before meals  insulin regular Infusion 9 Unit(s)/Hr IV Continuous <Continuous>  sodium chloride 0.45% with potassium chloride 20 mEq/L 1000 milliLiter(s) IV Continuous <Continuous>    PRN MEDICATIONS    VITALS:   T(F): 97  HR: 81  BP: 105/72  RR: 19  SpO2: 97%    PHYSICAL EXAM:  GEN: No acute distress  LUNGS: Clear to auscultation bilaterally   HEART: S1/S2 present.   ABD: Soft, non-tender, non-distended. Bowel sounds presen  NEURO: AAOX1      LABS:                        14.8   x     )-----------( 137      ( 10 Apr 2020 07:06 )             44.9     04-09    143  |  106  |  52<H>  ----------------------------<  106<H>  5.8<H>   |  23  |  1.2    Ca    8.1<L>      09 Apr 2020 23:30  Mg     4.2     04-09    TPro  8.1<H>  /  Alb  4.3  /  TBili  0.6  /  DBili  0.2  /  AST  53<H>  /  ALT  60<H>  /  AlkPhos  146<H>  04-09                Culture - Urine (collected 08 Apr 2020 08:54)  Source: .Urine Clean Catch (Midstream)  Final Report (09 Apr 2020 17:09):    <10,000 CFU/mL Normal Urogenital Marcela    Culture - Blood (collected 08 Apr 2020 08:50)  Source: .Blood Blood-Peripheral  Gram Stain (10 Apr 2020 01:41):    Growth in aerobic bottle:    Gram Negative Rods  Preliminary Report (10 Apr 2020 01:41):    Growth in aerobic bottle:    Gram Negative Rods    "Due to technical problems, Proteus sp. will Not be reported as part of    the BCID panel until further notice"    ***Blood Panel PCR results on this specimen are available    approximately 3 hours after theGram stain result.***    Gram stain, PCR, and/or culture results may not always    correspond due to difference in methodologies.    ************************************************************    This PCR assay was performed using [employer].    The following targets are tested for: Enterococcus,    vancomycin resistant enterococci, Listeria monocytogenes,    coagulase negative staphylococci, S. aureus,    methicillin resistant S. aureus, Streptococcus agalactiae    (Group B), S. pneumoniae, S. pyogenes (Group A),    Acinetobacter baumannii, Enterobacter cloacae, E. coli,    Klebsiella oxytoca, K. pneumoniae, Proteus sp.,    Serratia marcescens, Haemophilus influenzae,    Neisseria meningitidis, Pseudomonas aeruginosa, Candida    albicans, C. glabrata, C krusei,C parapsilosis,    C. tropicalis and the KPC resistance gene.  Organism: Blood Culture PCR (10 Apr 2020 03:47)  Organism: Blood Culture PCR (10 Apr 2020 03:47)    Culture - Blood (collected 08 Apr 2020 08:45)  Source: .Blood Blood-Peripheral  Preliminary Report (09 Apr 2020 17:02):    No growth to date.      CARDIAC MARKERS ( 09 Apr 2020 06:00 )  x     / x     / 1463 U/L / x     / x      CARDIAC MARKERS ( 08 Apr 2020 16:04 )  x     / x     / 2387 U/L / x     / x            RADIOLOGY:

## 2020-04-10 NOTE — PROGRESS NOTE ADULT - SUBJECTIVE AND OBJECTIVE BOX
Pt converted from IV insulin drip to basal/bolus therapy this AM (glargine 25 U qAM and Lispro 8 U ac), as BS much improved, pt eating, and acidosis resolved.  If remains stable today suggest discharge on above regimen.  If pt/family wishes they can call office ([phone]) to set up virtual visit in 1 week.

## 2020-04-10 NOTE — CHART NOTE - NSCHARTNOTEFT_GEN_A_CORE
was called by RN as patient's FS was in 480s. Unsure whether patient was bridged with SubQ insulin while on insulin drip.   - ordered lispro will monitor FS  - will fu am [facility] for AG

## 2020-04-11 LAB
ALBUMIN SERPL ELPH-MCNC: 3.5 G/DL — SIGNIFICANT CHANGE UP (ref 3.5–5.2)
ALP SERPL-CCNC: 121 U/L — HIGH (ref 30–115)
ALT FLD-CCNC: 48 U/L — HIGH (ref 0–41)
ANION GAP SERPL CALC-SCNC: 12 MMOL/L — SIGNIFICANT CHANGE UP (ref 7–14)
ANION GAP SERPL CALC-SCNC: 12 MMOL/L — SIGNIFICANT CHANGE UP (ref 7–14)
ANION GAP SERPL CALC-SCNC: 19 MMOL/L — HIGH (ref 7–14)
AST SERPL-CCNC: 50 U/L — HIGH (ref 0–41)
BASOPHILS # BLD AUTO: 0.01 K/UL — SIGNIFICANT CHANGE UP (ref 0–0.2)
BASOPHILS NFR BLD AUTO: 0.1 % — SIGNIFICANT CHANGE UP (ref 0–1)
BILIRUB SERPL-MCNC: 0.6 MG/DL — SIGNIFICANT CHANGE UP (ref 0.2–1.2)
BUN SERPL-MCNC: 27 MG/DL — HIGH (ref 10–20)
BUN SERPL-MCNC: 29 MG/DL — HIGH (ref 10–20)
BUN SERPL-MCNC: 32 MG/DL — HIGH (ref 10–20)
CALCIUM SERPL-MCNC: 8.5 MG/DL — SIGNIFICANT CHANGE UP (ref 8.5–10.1)
CALCIUM SERPL-MCNC: 8.9 MG/DL — SIGNIFICANT CHANGE UP (ref 8.5–10.1)
CALCIUM SERPL-MCNC: 8.9 MG/DL — SIGNIFICANT CHANGE UP (ref 8.5–10.1)
CHLORIDE SERPL-SCNC: 101 MMOL/L — SIGNIFICANT CHANGE UP (ref 98–110)
CHLORIDE SERPL-SCNC: 102 MMOL/L — SIGNIFICANT CHANGE UP (ref 98–110)
CHLORIDE SERPL-SCNC: 106 MMOL/L — SIGNIFICANT CHANGE UP (ref 98–110)
CO2 SERPL-SCNC: 18 MMOL/L — SIGNIFICANT CHANGE UP (ref 17–32)
CO2 SERPL-SCNC: 23 MMOL/L — SIGNIFICANT CHANGE UP (ref 17–32)
CO2 SERPL-SCNC: 23 MMOL/L — SIGNIFICANT CHANGE UP (ref 17–32)
CREAT SERPL-MCNC: 1.1 MG/DL — SIGNIFICANT CHANGE UP (ref 0.7–1.5)
CREAT SERPL-MCNC: 1.2 MG/DL — SIGNIFICANT CHANGE UP (ref 0.7–1.5)
CREAT SERPL-MCNC: 1.3 MG/DL — SIGNIFICANT CHANGE UP (ref 0.7–1.5)
EOSINOPHIL # BLD AUTO: 0.03 K/UL — SIGNIFICANT CHANGE UP (ref 0–0.7)
EOSINOPHIL NFR BLD AUTO: 0.4 % — SIGNIFICANT CHANGE UP (ref 0–8)
GLUCOSE BLDC GLUCOMTR-MCNC: 266 MG/DL — HIGH (ref 70–99)
GLUCOSE BLDC GLUCOMTR-MCNC: 267 MG/DL — HIGH (ref 70–99)
GLUCOSE BLDC GLUCOMTR-MCNC: 340 MG/DL — HIGH (ref 70–99)
GLUCOSE BLDC GLUCOMTR-MCNC: 381 MG/DL — HIGH (ref 70–99)
GLUCOSE BLDC GLUCOMTR-MCNC: 398 MG/DL — HIGH (ref 70–99)
GLUCOSE BLDC GLUCOMTR-MCNC: 435 MG/DL — HIGH (ref 70–99)
GLUCOSE BLDC GLUCOMTR-MCNC: 442 MG/DL — HIGH (ref 70–99)
GLUCOSE BLDC GLUCOMTR-MCNC: 519 MG/DL — CRITICAL HIGH (ref 70–99)
GLUCOSE BLDC GLUCOMTR-MCNC: 565 MG/DL — CRITICAL HIGH (ref 70–99)
GLUCOSE SERPL-MCNC: 309 MG/DL — HIGH (ref 70–99)
GLUCOSE SERPL-MCNC: 321 MG/DL — HIGH (ref 70–99)
GLUCOSE SERPL-MCNC: 373 MG/DL — HIGH (ref 70–99)
HCT VFR BLD CALC: 42.8 % — SIGNIFICANT CHANGE UP (ref 42–52)
HGB BLD-MCNC: 14.3 G/DL — SIGNIFICANT CHANGE UP (ref 14–18)
IMM GRANULOCYTES NFR BLD AUTO: 1.1 % — HIGH (ref 0.1–0.3)
ISLET CELL512 AB SER-ACNC: SIGNIFICANT CHANGE UP
LYMPHOCYTES # BLD AUTO: 2.18 K/UL — SIGNIFICANT CHANGE UP (ref 1.2–3.4)
LYMPHOCYTES # BLD AUTO: 27.6 % — SIGNIFICANT CHANGE UP (ref 20.5–51.1)
MAGNESIUM SERPL-MCNC: 3.1 MG/DL — CRITICAL HIGH (ref 1.8–2.4)
MCHC RBC-ENTMCNC: 30.4 PG — SIGNIFICANT CHANGE UP (ref 27–31)
MCHC RBC-ENTMCNC: 33.4 G/DL — SIGNIFICANT CHANGE UP (ref 32–37)
MCV RBC AUTO: 91.1 FL — SIGNIFICANT CHANGE UP (ref 80–94)
MONOCYTES # BLD AUTO: 1.25 K/UL — HIGH (ref 0.1–0.6)
MONOCYTES NFR BLD AUTO: 15.8 % — HIGH (ref 1.7–9.3)
NEUTROPHILS # BLD AUTO: 4.33 K/UL — SIGNIFICANT CHANGE UP (ref 1.4–6.5)
NEUTROPHILS NFR BLD AUTO: 55 % — SIGNIFICANT CHANGE UP (ref 42.2–75.2)
NRBC # BLD: 0 /100 WBCS — SIGNIFICANT CHANGE UP (ref 0–0)
PLATELET # BLD AUTO: 110 K/UL — LOW (ref 130–400)
POTASSIUM SERPL-MCNC: 4.4 MMOL/L — SIGNIFICANT CHANGE UP (ref 3.5–5)
POTASSIUM SERPL-MCNC: 4.6 MMOL/L — SIGNIFICANT CHANGE UP (ref 3.5–5)
POTASSIUM SERPL-MCNC: 5.5 MMOL/L — HIGH (ref 3.5–5)
POTASSIUM SERPL-SCNC: 4.4 MMOL/L — SIGNIFICANT CHANGE UP (ref 3.5–5)
POTASSIUM SERPL-SCNC: 4.6 MMOL/L — SIGNIFICANT CHANGE UP (ref 3.5–5)
POTASSIUM SERPL-SCNC: 5.5 MMOL/L — HIGH (ref 3.5–5)
PROT SERPL-MCNC: 6.9 G/DL — SIGNIFICANT CHANGE UP (ref 6–8)
RBC # BLD: 4.7 M/UL — SIGNIFICANT CHANGE UP (ref 4.7–6.1)
RBC # FLD: 12.4 % — SIGNIFICANT CHANGE UP (ref 11.5–14.5)
SODIUM SERPL-SCNC: 136 MMOL/L — SIGNIFICANT CHANGE UP (ref 135–146)
SODIUM SERPL-SCNC: 137 MMOL/L — SIGNIFICANT CHANGE UP (ref 135–146)
SODIUM SERPL-SCNC: 143 MMOL/L — SIGNIFICANT CHANGE UP (ref 135–146)
WBC # BLD: 7.89 K/UL — SIGNIFICANT CHANGE UP (ref 4.8–10.8)
WBC # FLD AUTO: 7.89 K/UL — SIGNIFICANT CHANGE UP (ref 4.8–10.8)

## 2020-04-11 PROCEDURE — 99233 SBSQ HOSP IP/OBS HIGH 50: CPT

## 2020-04-11 PROCEDURE — 71045 X-RAY EXAM CHEST 1 VIEW: CPT | Mod: 26

## 2020-04-11 RX ORDER — INSULIN LISPRO 100/ML
6 VIAL (ML) SUBCUTANEOUS ONCE
Refills: 0 | Status: COMPLETED | OUTPATIENT
Start: 2020-04-11 | End: 2020-04-11

## 2020-04-11 RX ORDER — ISOPROPYL ALCOHOL, BENZOCAINE .7; .06 ML/ML; ML/ML
1 SWAB TOPICAL
Qty: 100 | Refills: 1
Start: 2020-04-11 | End: 2020-05-30

## 2020-04-11 RX ORDER — METFORMIN HYDROCHLORIDE 850 MG/1
1 TABLET ORAL
Qty: 60 | Refills: 0
Start: 2020-04-11 | End: 2020-05-10

## 2020-04-11 RX ORDER — INSULIN GLARGINE 100 [IU]/ML
45 INJECTION, SOLUTION SUBCUTANEOUS AT BEDTIME
Refills: 0 | Status: DISCONTINUED | OUTPATIENT
Start: 2020-04-11 | End: 2020-04-12

## 2020-04-11 RX ORDER — GLIMEPIRIDE 1 MG
1 TABLET ORAL
Qty: 30 | Refills: 0
Start: 2020-04-11 | End: 2020-05-10

## 2020-04-11 RX ORDER — CEFPODOXIME PROXETIL 100 MG
1 TABLET ORAL
Qty: 20 | Refills: 0
Start: 2020-04-11 | End: 2020-04-20

## 2020-04-11 RX ORDER — DEXTROSE 50 % IN WATER 50 %
25 SYRINGE (ML) INTRAVENOUS ONCE
Refills: 0 | Status: DISCONTINUED | OUTPATIENT
Start: 2020-04-11 | End: 2020-04-12

## 2020-04-11 RX ORDER — DEXTROSE 50 % IN WATER 50 %
12.5 SYRINGE (ML) INTRAVENOUS ONCE
Refills: 0 | Status: DISCONTINUED | OUTPATIENT
Start: 2020-04-11 | End: 2020-04-12

## 2020-04-11 RX ORDER — INSULIN LISPRO 100/ML
VIAL (ML) SUBCUTANEOUS
Refills: 0 | Status: DISCONTINUED | OUTPATIENT
Start: 2020-04-11 | End: 2020-04-12

## 2020-04-11 RX ORDER — GLUCAGON INJECTION, SOLUTION 0.5 MG/.1ML
1 INJECTION, SOLUTION SUBCUTANEOUS ONCE
Refills: 0 | Status: DISCONTINUED | OUTPATIENT
Start: 2020-04-11 | End: 2020-04-12

## 2020-04-11 RX ORDER — CEFPODOXIME PROXETIL 100 MG
200 TABLET ORAL EVERY 12 HOURS
Refills: 0 | Status: DISCONTINUED | OUTPATIENT
Start: 2020-04-12 | End: 2020-04-12

## 2020-04-11 RX ORDER — INSULIN LISPRO 100/ML
4 VIAL (ML) SUBCUTANEOUS ONCE
Refills: 0 | Status: COMPLETED | OUTPATIENT
Start: 2020-04-11 | End: 2020-04-11

## 2020-04-11 RX ORDER — DEXTROSE 50 % IN WATER 50 %
15 SYRINGE (ML) INTRAVENOUS ONCE
Refills: 0 | Status: DISCONTINUED | OUTPATIENT
Start: 2020-04-11 | End: 2020-04-12

## 2020-04-11 RX ORDER — INSULIN LISPRO 100/ML
13 VIAL (ML) SUBCUTANEOUS
Refills: 0 | Status: DISCONTINUED | OUTPATIENT
Start: 2020-04-11 | End: 2020-04-11

## 2020-04-11 RX ORDER — INSULIN GLARGINE 100 [IU]/ML
10 INJECTION, SOLUTION SUBCUTANEOUS ONCE
Refills: 0 | Status: COMPLETED | OUTPATIENT
Start: 2020-04-11 | End: 2020-04-11

## 2020-04-11 RX ORDER — INSULIN LISPRO 100/ML
15 VIAL (ML) SUBCUTANEOUS
Refills: 0 | Status: DISCONTINUED | OUTPATIENT
Start: 2020-04-11 | End: 2020-04-12

## 2020-04-11 RX ORDER — SODIUM CHLORIDE 9 MG/ML
1000 INJECTION, SOLUTION INTRAVENOUS
Refills: 0 | Status: DISCONTINUED | OUTPATIENT
Start: 2020-04-11 | End: 2020-04-12

## 2020-04-11 RX ORDER — INSULIN GLARGINE 100 [IU]/ML
38 INJECTION, SOLUTION SUBCUTANEOUS EVERY MORNING
Refills: 0 | Status: DISCONTINUED | OUTPATIENT
Start: 2020-04-11 | End: 2020-04-11

## 2020-04-11 RX ORDER — SITAGLIPTIN 50 MG/1
1 TABLET, FILM COATED ORAL
Qty: 30 | Refills: 0
Start: 2020-04-11 | End: 2020-05-10

## 2020-04-11 RX ORDER — PIOGLITAZONE HYDROCHLORIDE 15 MG/1
1 TABLET ORAL
Qty: 30 | Refills: 0
Start: 2020-04-11 | End: 2020-05-10

## 2020-04-11 RX ADMIN — Medication 13 UNIT(S): at 11:57

## 2020-04-11 RX ADMIN — INSULIN GLARGINE 38 UNIT(S): 100 INJECTION, SOLUTION SUBCUTANEOUS at 11:50

## 2020-04-11 RX ADMIN — Medication 15 UNIT(S): at 17:01

## 2020-04-11 RX ADMIN — DEXTROSE MONOHYDRATE, SODIUM CHLORIDE, AND POTASSIUM CHLORIDE 100 MILLILITER(S): 50; .745; 4.5 INJECTION, SOLUTION INTRAVENOUS at 03:15

## 2020-04-11 RX ADMIN — INSULIN GLARGINE 45 UNIT(S): 100 INJECTION, SOLUTION SUBCUTANEOUS at 23:20

## 2020-04-11 RX ADMIN — Medication 6 UNIT(S): at 02:11

## 2020-04-11 RX ADMIN — HEPARIN SODIUM 5000 UNIT(S): 5000 INJECTION INTRAVENOUS; SUBCUTANEOUS at 06:10

## 2020-04-11 RX ADMIN — CEFTRIAXONE 100 MILLIGRAM(S): 500 INJECTION, POWDER, FOR SOLUTION INTRAMUSCULAR; INTRAVENOUS at 06:09

## 2020-04-11 RX ADMIN — Medication 10: at 08:11

## 2020-04-11 RX ADMIN — Medication 12: at 11:57

## 2020-04-11 RX ADMIN — CHLORHEXIDINE GLUCONATE 1 APPLICATION(S): 213 SOLUTION TOPICAL at 04:00

## 2020-04-11 RX ADMIN — Medication 4 UNIT(S): at 00:57

## 2020-04-11 RX ADMIN — Medication 10 UNIT(S): at 08:10

## 2020-04-11 RX ADMIN — INSULIN GLARGINE 10 UNIT(S): 100 INJECTION, SOLUTION SUBCUTANEOUS at 12:47

## 2020-04-11 RX ADMIN — Medication 12: at 17:01

## 2020-04-11 NOTE — PROGRESS NOTE ADULT - SUBJECTIVE AND OBJECTIVE BOX
MEDICATIONS  (STANDING):  cefTRIAXone   IVPB 2000 milliGRAM(s) IV Intermittent every 24 hours  chlorhexidine 4% Liquid 1 Application(s) Topical <User Schedule>  insulin glargine Injectable (LANTUS) 38 Unit(s) SubCutaneous every morning  insulin glargine Injectable (LANTUS) 10 Unit(s) SubCutaneous once  insulin lispro (HumaLOG) corrective regimen sliding scale   SubCutaneous three times a day before meals  insulin lispro Injectable (HumaLOG) 13 Unit(s) SubCutaneous three times a day before meals    MEDICATIONS  (PRN):      Vital Signs Last 24 Hrs  T(C): 36.4 (11 Apr 2020 04:24), Max: 36.4 (10 Apr 2020 20:57)  T(F): 97.6 (11 Apr 2020 04:24), Max: 97.6 (11 Apr 2020 04:24)  HR: 77 (11 Apr 2020 04:24) (77 - 87)  BP: 132/68 (11 Apr 2020 04:24) (118/73 - 132/68)  BP(mean): --  RR: 18 (11 Apr 2020 04:24) (18 - 19)  SpO2: 100% (11 Apr 2020 08:20) (99% - 100%)    CAPILLARY BLOOD GLUCOSE      POCT Blood Glucose.: 565 mg/dL (11 Apr 2020 11:53)  POCT Blood Glucose.: 381 mg/dL (11 Apr 2020 07:49)  POCT Blood Glucose.: 266 mg/dL (11 Apr 2020 03:12)  POCT Blood Glucose.: 398 mg/dL (11 Apr 2020 01:57)  POCT Blood Glucose.: 442 mg/dL (11 Apr 2020 01:42)  POCT Blood Glucose.: 340 mg/dL (11 Apr 2020 00:08)  POCT Blood Glucose.: 380 mg/dL (10 Apr 2020 21:27)  POCT Blood Glucose.: 449 mg/dL (10 Apr 2020 19:33)  POCT Blood Glucose.: 407 mg/dL (10 Apr 2020 17:09)  POCT Blood Glucose.: 403 mg/dL (10 Apr 2020 16:19)      04-11    143  |  106  |  32<H>  ----------------------------<  321<H>  5.5<H>   |  18  |  1.2    Ca    8.9      11 Apr 2020 05:37  Mg     3.1     04-11    TPro  6.9  /  Alb  3.5  /  TBili  0.6  /  DBili  x   /  AST  50<H>  /  ALT  48<H>  /  AlkPhos  121<H>  04-11

## 2020-04-11 NOTE — DISCHARGE NOTE PROVIDER - CARE PROVIDERS DIRECT ADDRESSES
[email or web address]

## 2020-04-11 NOTE — DISCHARGE NOTE PROVIDER - HOSPITAL COURSE
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 History was taken from his sister Michael, who stated that the patient started having polydipsia 2 weeks ago and was drinking tap water at work ([employer]) which was unusual for him (he brings his own bottles). Last saturday, he started having polyuria at a point that he was peeing himself because he could not control his bladder. He came back from work exhausted and noted that he has been having dry mouth. He then started to be disoriented, saying incoherent sentences and he even got more confused and lethargic so he was brought to the ED for further management. No fever, chills, SOB, cough or myalgias. No abdominal pain or chest pain.         In ED, Glucose was >600, AG 34, Bicarb 8, Creatinine 2.6, K 6.7(on VBG), Na 124 and CK 2907. B- hydroxybutyrate was not sent. CXR wnl, CT A/P negative for pancreatitis, CT head unremarkable. UA positive for ketones and glucose; negative for UTI. EKG showing NSR with peaked T waves, QTc is 480. VBG showing pH of 7.1, pO2 of 51 and pCO2 of 51. He is s/p 20 U regular insulin IV and 2 L of LR. He was started on insulin drip and LR at 250 cc/hr. Repeat Glucose was 460 on POCT. He was admitted with a working diagnosis of DKA and was started on insulin drip and fluids. The AG closed to 13 and he was switched to subQ insulin: glargine 35U at bedtime and lispro 10U three times a day with meals. His FS was was 381 so  the insulin regimen was increased to glargine 39U at bedtime and 13U lispro three times a day with meals. 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 History was taken from his sister Michael, who stated that the patient started having polydipsia 2 weeks ago and was drinking tap water at work ([employer]) which was unusual for him (he brings his own bottles). Last saturday, he started having polyuria at a point that he was peeing himself because he could not control his bladder. He came back from work exhausted and noted that he has been having dry mouth. He then started to be disoriented, saying incoherent sentences and he even got more confused and lethargic so he was brought to the ED for further management. No fever, chills, SOB, cough or myalgias. No abdominal pain or chest pain.         In ED, Glucose was >600, AG 34, Bicarb 8, Creatinine 2.6, K 6.7(on VBG), Na 124 and CK 2907. B- hydroxybutyrate was not sent. CXR wnl, CT A/P negative for pancreatitis, CT head unremarkable. UA positive for ketones and glucose; negative for UTI. EKG showing NSR with peaked T waves, QTc is 480. VBG showing pH of 7.1, pO2 of 51 and pCO2 of 51. He is s/p 20 U regular insulin IV and 2 L of LR. He was started on insulin drip and LR at 250 cc/hr. Repeat Glucose was 460 on POCT. He was admitted with a working diagnosis of DKA and was started on insulin drip and fluids. The AG closed to 13 and he was switched to subQ insulin: glargine 35U at bedtime and lispro 10U three times a day with meals. His FS was was 381 so  the insulin regimen was increased to glargine 39U at bedtime and 13U lispro three times a day with meals. After discussion with patient and family, it will be best to send him home on oral medications for diabetes. He will follow up with endocrinologist via telemedicine in 1 week.

## 2020-04-11 NOTE — PROVIDER CONTACT NOTE (OTHER) - SITUATION
MD Grijalva made aware pt has 565 FS - pt did not receive his morning lantus as order was changed and unavailable and was given recently, MD verbalized understanding and is about to

## 2020-04-11 NOTE — PROGRESS NOTE ADULT - SUBJECTIVE AND OBJECTIVE BOX
CHANDA NIETO  44y  Male  ***My note supersedes ALL resident notes that I sign.  My corrections for their notes are in my note.***    I can be reached directly on [employer]. My office number is [phone]. My personal cell number is [phone].    INTERVAL EVENTS: Here for f/u of DKA. Pt has autism, but is functional (works) and speaks fine.  He was cooperative and friendly. He lives w/ his mom and sister.  He understands that he has DM and needs to be careful not to eat sweets.  He feels ready to go home today.     T(F): 97.6 (04-11-20 @ 04:24), Max: 97.6 (04-11-20 @ 04:24)  HR: 77 (04-11-20 @ 04:24) (77 - 87)  BP: 132/68 (04-11-20 @ 04:24) (118/73 - 132/68)  RR: 18 (04-11-20 @ 04:24) (18 - 19)  SpO2: 100% (04-11-20 @ 08:20) (99% - 100%)    BMI 35.2    Gen: NAD; obese  HEENT: WNL  Neck: no JVD  lungs: clr  Hrt: s1 s2 rrr  Abd: soft, NT/ND  Ext: no edema, no c/c  neuro: WNL    LABS:                        14.3    (    91.1   7.89  )-----------( ---------      110      ( 11 Apr 2020 05:37 )             42.8    (    12.4     Platelet Count - Automated: 110 K/uL (04-11-20 @ 05:37)  Platelet Count - Automated: 137 K/uL (04-10-20 @ 07:06)  Platelet Count - Automated: 192 K/uL (04-09-20 @ 06:00)  Platelet Count - Automated: 210 K/uL (04-08-20 @ 08:54)    143   (   106   (   321      04-11-20 @ 05:37  ----------------------               5.5   (   18   (   32     AG = 19                        -----                        1.2  Ca  8.9   Mg  3.1    P   --     LFT  6.9  (  0.6  (  50       04-11-20 @ 05:37  -------------------------  3.5  (  121  (  48    T monique 0.6     AST 50  ALT 48  04-11-20 @ 05:37 - better  T monique 0.5     AST 64  ALT 54  04-10-20 @ 07:06  T monique 0.6     AST 53  ALT 60  04-09-20 @ 06:00  T monique 0.5     AST 70  ALT 62  04-08-20 @ 08:54    CAPILLARY BLOOD GLUCOSE  POCT Blood Glucose.: 381 (04-11-20 @ 07:49)  POCT Blood Glucose.: 266 (04-11-20 @ 03:12)  POCT Blood Glucose.: 398 (04-11-20 @ 01:57)  POCT Blood Glucose.: 442 (04-11-20 @ 01:42)  POCT Blood Glucose.: 340 (04-11-20 @ 00:08)  POCT Blood Glucose.: 380 (04-10-20 @ 21:27)  POCT Blood Glucose.: 449 (04-10-20 @ 19:33)  POCT Blood Glucose.: 407 (04-10-20 @ 17:09)      Culture - Urine (collected 04-08-20 @ 08:54)  Source: .Urine Clean Catch (Midstream)  Final Report (04-09-20 @ 17:09):    <10,000 CFU/mL Normal Urogenital Marcela    Culture - Blood (collected 04-08-20 @ 08:50)  Source: .Blood Blood-Peripheral  Gram Stain (04-10-20 @ 01:41):    Growth in aerobic bottle:    Gram Negative Rods  Preliminary Report (04-10-20 @ 01:41):    Growth in aerobic bottle:    Gram Negative Rods    "Due to technical problems, Proteus sp. will Not be reported as part of    the BCID panel until further notice"    ***Blood Panel PCR results on this specimen are available    approximately 3 hours after theGram stain result.***    Gram stain, PCR, and/or culture results may not always    correspond due to difference in methodologies.    ************************************************************    This PCR assay was performed using [employer].    The following targets are tested for: Enterococcus,    vancomycin resistant enterococci, Listeria monocytogenes,    coagulase negative staphylococci, S. aureus,    methicillin resistant S. aureus, Streptococcus agalactiae    (Group B), S. pneumoniae, S. pyogenes (Group A),    Acinetobacter baumannii, Enterobacter cloacae, E. coli,    Klebsiella oxytoca, K. pneumoniae, Proteus sp.,    Serratia marcescens, Haemophilus influenzae,    Neisseria meningitidis, Pseudomonas aeruginosa, Candida    albicans, C. glabrata, C krusei,C parapsilosis,    C. tropicalis and the KPC resistance gene.  Organism: Blood Culture PCR (04-10-20 @ 03:47)  Organism: Blood Culture PCR (04-10-20 @ 03:47)      -  Acinetobacter baumanii: Nondet      -  Candida albicans: Nondet      -  Candida glabrata: Nondet      -  Candida krusei: Nondet      -  Candida parapsilosis: Nondet      -  Candida tropicalis: Nondet      -  Coagulase negative Staphylococcus: Nondet      -  Enterobacter cloacae complex: Nondet      -  Enterococcus species: Nondet      -  Escherichia coli: Nondet      -  Haemophilus influenzae: Nondet      -  Klebsiella oxytoca: Nondet      -  Klebsiella pneumoniae: Nondet      -  Listeria monocytogenes: Nondet      -  Methicillin resistant Staphylococcus aureus (MRSA): Nondet      -  Multidrug (KPC pos) resistant organism: Nondet      -  Neisseria meningitidis: Nondet      -  Pseudomonas aeruginosa: Nondet      -  Serratia marcescens: Nondet      -  Staphylococcus aureus: Nondet      -  Streptococcus agalactiae (Group B): Nondet      -  Streptococcus pneumoniae: Nondet      -  Streptococcus pyogenes (Group A): Nondet      -  Streptococcus sp. (Not Grp A, B or S pneumoniae): Nondet      -  Vancomycin resistant Enterococcus sp.: Nondet      Method Type: PCR    Culture - Blood (collected 04-08-20 @ 08:45)  Source: .Blood Blood-Peripheral  Preliminary Report (04-09-20 @ 17:02):    No growth to date.    RADIOLOGY & ADDITIONAL TESTS:  < from: US Kidney and Bladder (04.10.20 @ 11:48) >  IMPRESSION:    No hydronephrosis.    Urinary bladder volume of approximately 158 cc. Patient reported no urge to urinate.    < end of copied text >    < from: CT Abdomen and Pelvis w/ IV Cont (04.08.20 @ 12:39) >  IMPRESSION:    CT demonstrates no significant peripancreatic inflammatory change. No peripancreatic fluid collection.    < end of copied text >      MEDICATIONS:  cefTRIAXone   IVPB 2000 milliGRAM(s) IV Intermittent every 24 hours    chlorhexidine 4% Liquid 1 Application(s) Topical <User Schedule>  heparin  Injectable 5000 Unit(s) SubCutaneous every 8 hours  insulin glargine Injectable (LANTUS) 38 Unit(s) SubCutaneous every morning  insulin lispro (HumaLOG) corrective regimen sliding scale   SubCutaneous three times a day before meals  insulin lispro Injectable (HumaLOG) 13 Unit(s) SubCutaneous three times a day before meals

## 2020-04-11 NOTE — PROGRESS NOTE ADULT - ASSESSMENT
# New Onset DM w/ DKA likely T2 (given age, obesity and resistance to insulin)  pt was in ICU and now on floor, clinically looks well  HCO3 is better (but not perfect); AG is 19  pt is eating and drinking very well  given his autism, I would like to try oral meds (I am aware endo is rec insulin on d/c)  Amaryl 4mg po qAM  Metformin 500mg po q12  Januvia 50mg qAM  Actos 15mg po qAM   will likely need further titration  might need bedtime lantus additionally  could consider long-acting GLP-1 analog as outpt  can have telemed consult w/ endo (Dr Ag) as outpt [phone]  needs glucometer, lancet, alcohol swabs and test strips to check FS 2-3x/day (before meals) and keep log book  if pt returns w/ DKA, then he will likely need insulin going forwards    # GNR in blood (bacteremia), no sepsis on admit  I spoke w/ [employer] in [place] - it seems like it will be Moraxella [place]ensis  the etiology of where this arose in uncertain  there is a neg bld cx from the same date (perhaps the positive one is a contaminate)  pt looks clinically well  will treat w/ Vantin 200mg po q12 for 10 more days    # As outpt, will need to see ophth    # once sugars better, will needs lipids checked (as outpt)    # BMI 35.2 c/w obeisty  wt loss strongly advised to pt  needs to watch his diet very carefully, rowena sweets    # thrombocytopenia developing here - perhaps due to heparin - d/c it    # GISELA 2/2 dehydration from uncontrolled DM  K+ of 5.5 is hemolyzed - no need to tx  seems to have resolved  he might have start of CKD, but u/a shows only tr prot and albumin is 3.5  outpt f/u w/ IM (could consider renal eval as needed)    # mildly high Mg - not on Mg products  just observe    # DVT ppx: ambulation    Dispo: send home today; plan as above # New Onset DM w/ DKA likely T2 (given age, obesity and resistance to insulin)  pt was in ICU and now on floor, clinically looks well  HCO3 is better (but not perfect); AG is 19  pt is eating and drinking very well  given his autism, I would like to try oral meds (I am aware endo is rec insulin on d/c)  Amaryl 4mg po qAM  Metformin 500mg po q12  Januvia 50mg qAM  Actos 15mg po qAM   will likely need further titration  might need bedtime lantus additionally  could consider long-acting GLP-1 analog as outpt  can have telemed consult w/ endo (Dr Ag) as outpt [phone]  needs glucometer, lancet, alcohol swabs and test strips to check FS 2-3x/day (before meals) and keep log book  if pt returns w/ DKA, then he will likely need insulin going forwards    # GNR in blood (bacteremia), no sepsis on admit  I spoke w/ [employer] in [place] ([phone]) - it seems like it will be Moraxella [place]ensis  the etiology of where this arose in uncertain  there is a neg bld cx from the same date (perhaps the positive one is a contaminate)  pt looks clinically well  will treat w/ Vantin 200mg po q12 for 10 more days    # As outpt, will need to see ophth    # once sugars better, will needs lipids checked (as outpt)    # BMI 35.2 c/w obeisty  wt loss strongly advised to pt  needs to watch his diet very carefully, rowena sweets    # thrombocytopenia developing here - perhaps due to heparin - d/c it    # GISELA 2/2 dehydration from uncontrolled DM  K+ of 5.5 is hemolyzed - no need to tx  seems to have resolved  he might have start of CKD, but u/a shows only tr prot and albumin is 3.5  outpt f/u w/ IM (could consider renal eval as needed)    # mildly high Mg - not on Mg products  just observe    # DVT ppx: ambulation    Dispo: send home today; plan as above # New Onset DM w/ DKA likely T2 (given age, obesity and resistance to insulin)  I spoke w/ Dr Ag today  pt was in ICU and now on floor, clinically looks well  pt is eating and drinking very well  HCO3 is better (but not perfect); AG is 19 (rising) - check BMP tomorrow  glucose is back up into 500s - gave lantus 10 units extra this AM  incr Lantus 45 HS and lisp 15/meal w/ +2 CF scale  FS qac and HS and keep btw 100-180 -> residents can raise insulin doses today or tomorrow to tighten control  given his autism, I would like to try mostly oral meds (Endo and I worked out a d/c plan below)   Lantus HS - prob 50 units, but might make it higher tomorrow  Amaryl 4mg po qAM  Metformin 500mg po q12  Januvia 100mg qAM  hold off on actos  will likely need further titration  could consider long-acting GLP-1 analog as outpt  can have telemed consult w/ ade (Dr Ag) as outpt [phone]  needs glucometer, lancet, alcohol swabs and test strips to check FS 2-3x/day (before meals) and keep log book  CM will send in VNS to diabetic teaching  if pt returns w/ DKA, then he will likely need a full insulin regimen going forwards    # GNR in blood (bacteremia), no sepsis on admit  I spoke w/ [employer] in [place] ([phone]) - it seems like it will be Moraxella [place]ensis  the etiology of where this arose in uncertain  there is a neg bld cx from the same date (perhaps the positive one is a contaminate)  pt looks clinically well  will treat w/ Vantin 200mg po q12 for 10 more days    # As outpt, will need to see ophth    # once sugars better, will needs lipids checked (as outpt)    # BMI 35.2 c/w obeisty  wt loss strongly advised to pt  needs to watch his diet very carefully, rowena sweets    # thrombocytopenia developing here - perhaps due to heparin - d/c it    # GISELA 2/2 dehydration from uncontrolled DM  K+ of 5.5 is hemolyzed - no need to tx  seems to have resolved  he might have start of CKD, but u/a shows only tr prot and albumin is 3.5  outpt f/u w/ IM (could consider renal eval as needed)    # mildly high Mg - not on Mg products  just observe    # DVT ppx: ambulation    Dispo: plan as above; poss d/c tomorrow if FS more controlled (in 200s) and gap closed

## 2020-04-11 NOTE — DISCHARGE NOTE PROVIDER - NSDCCPCAREPLAN_GEN_ALL_CORE_FT
PRINCIPAL DISCHARGE DIAGNOSIS  Diagnosis: DKA (diabetic ketoacidoses)  Assessment and Plan of Treatment: -monitor your finger sticks in the morning and before each meal  -please take glargine 38 units injected at bedtime  -please take lispro 13 units before each meal three times a day  -limit bread, pasta, sugar intake  -follow up with your primary medical physician  -maintain adequate hydration      SECONDARY DISCHARGE DIAGNOSES  Diagnosis: GISELA (acute kidney injury)  Assessment and Plan of Treatment: -resolved  -maintain adequate oral intake PRINCIPAL DISCHARGE DIAGNOSIS  Diagnosis: DKA (diabetic ketoacidoses)  Assessment and Plan of Treatment: -monitor your finger sticks in the morning and before each meal  -please take amaryl 4mg in the morning  - please take metformin 500mg twice a day  - please take januvia 50mg in the morning  - please take actos 15mg in the morning  -limit bread, pasta, sugar intake  -follow up with your primary medical physician  -maintain adequate hydration  - you need to follow up with the endocrinologist Dr. Ag in 1 week, which can be done over the phone by calling [phone]      SECONDARY DISCHARGE DIAGNOSES  Diagnosis: GISELA (acute kidney injury)  Assessment and Plan of Treatment: -resolved  -maintain adequate oral intake PRINCIPAL DISCHARGE DIAGNOSIS  Diagnosis: DKA (diabetic ketoacidoses)  Assessment and Plan of Treatment: -monitor your finger sticks in the morning and before each meal  -please take amaryl 4mg in the morning  - please take metformin 500mg twice a day  - please take januvia 100mg in the morning  -limit bread, pasta, sugar intake  -follow up with your primary medical physician  -maintain adequate hydration  - you need to follow up with the endocrinologist Dr. Ag in 1 week, which can be done over the phone by calling [phone]      SECONDARY DISCHARGE DIAGNOSES  Diagnosis: GISELA (acute kidney injury)  Assessment and Plan of Treatment: -resolved  -maintain adequate oral intake

## 2020-04-11 NOTE — DISCHARGE NOTE NURSING/CASE MANAGEMENT/SOCIAL WORK - PATIENT PORTAL LINK FT
You can access the FollowMyHealth Patient Portal offered by [facility] by registering at the following website: [email or web address]. By joining [employer]’s FollowMyHealth portal, you will also be able to view your health information using other applications (apps) compatible with our system.

## 2020-04-11 NOTE — DISCHARGE NOTE PROVIDER - CARE PROVIDER_API CALL
Alfonso Ag)  EndocrinologyMetabDiabetes; Internal Medicine  [address] [place] [address]  Phone: [phone]  Fax: [phone]  Follow Up Time: 1 week

## 2020-04-11 NOTE — PROGRESS NOTE ADULT - ASSESSMENT
Recurrent hyperglycemia and mildly elevated anion gap    Agree with increase in basal/bolus therapy  Encourage PO fluids.  Repeat BMP later today.  Unfortunately, pt will need some insulin on eventual discharge-may be able to try one injection of Glargine plus orals; would get  involved to set up visiting RN to aid with insulin administration.  Islet cell Abs are pending.

## 2020-04-11 NOTE — DISCHARGE NOTE PROVIDER - NSDCMRMEDTOKEN_GEN_ALL_CORE_FT
Actos 15 mg oral tablet: 1 tab(s) orally once a day (in the morning)   alcohol swabs : Apply topically to affected area 4 times a day   Amaryl 4 mg oral tablet: 1 tab(s) orally once a day (in the morning)   cefpodoxime 200 mg oral tablet: 1 tab(s) orally 2 times a day   glucometer (per patient&#x27;s insurance): Test blood sugars four times a day. Dispense #1 glucometer.  Januvia 50 mg oral tablet: 1 tab(s) orally once a day (in the morning)   lancets: 1 application subcutaneously 4 times a day   metFORMIN 500 mg oral tablet: 1 tab(s) orally 2 times a day   test strips (per patient&#x27;s insurance): 1 application subcutaneously 4 times a day. ** Compatible with patient&#x27;s glucometer ** alcohol swabs : Apply topically to affected area 4 times a day   Amaryl 4 mg oral tablet: 1 tab(s) orally once a day (in the morning)   cefpodoxime 200 mg oral tablet: 1 tab(s) orally 2 times a day for 5 days  glucometer (per patient&#x27;s insurance): Test blood sugars four times a day. Dispense #1 glucometer.  Januvia 100 mg oral tablet: 1 tab(s) orally once a day   lancets: 1 application subcutaneously 4 times a day   Lantus 100 units/mL subcutaneous solution: 60 unit(s) subcutaneous once a day (at bedtime)   metFORMIN 500 mg oral tablet: 1 tab(s) orally 2 times a day   test strips (per patient&#x27;s insurance): 1 application subcutaneously 4 times a day. ** Compatible with patient&#x27;s glucometer **

## 2020-04-12 VITALS — HEART RATE: 95 BPM | DIASTOLIC BLOOD PRESSURE: 75 MMHG | TEMPERATURE: 98 F | SYSTOLIC BLOOD PRESSURE: 128 MMHG

## 2020-04-12 LAB
ALBUMIN SERPL ELPH-MCNC: 3.2 G/DL — LOW (ref 3.5–5.2)
ALP SERPL-CCNC: 94 U/L — SIGNIFICANT CHANGE UP (ref 30–115)
ALT FLD-CCNC: 47 U/L — HIGH (ref 0–41)
ANION GAP SERPL CALC-SCNC: 14 MMOL/L — SIGNIFICANT CHANGE UP (ref 7–14)
AST SERPL-CCNC: 53 U/L — HIGH (ref 0–41)
BILIRUB SERPL-MCNC: 0.4 MG/DL — SIGNIFICANT CHANGE UP (ref 0.2–1.2)
BUN SERPL-MCNC: 24 MG/DL — HIGH (ref 10–20)
CALCIUM SERPL-MCNC: 8.5 MG/DL — SIGNIFICANT CHANGE UP (ref 8.5–10.1)
CHLORIDE SERPL-SCNC: 106 MMOL/L — SIGNIFICANT CHANGE UP (ref 98–110)
CO2 SERPL-SCNC: 18 MMOL/L — SIGNIFICANT CHANGE UP (ref 17–32)
CREAT SERPL-MCNC: 1.1 MG/DL — SIGNIFICANT CHANGE UP (ref 0.7–1.5)
CULTURE RESULTS: SIGNIFICANT CHANGE UP
GLUCOSE BLDC GLUCOMTR-MCNC: 238 MG/DL — HIGH (ref 70–99)
GLUCOSE BLDC GLUCOMTR-MCNC: 265 MG/DL — HIGH (ref 70–99)
GLUCOSE BLDC GLUCOMTR-MCNC: 276 MG/DL — HIGH (ref 70–99)
GLUCOSE BLDC GLUCOMTR-MCNC: 290 MG/DL — HIGH (ref 70–99)
GLUCOSE BLDC GLUCOMTR-MCNC: 313 MG/DL — HIGH (ref 70–99)
GLUCOSE SERPL-MCNC: 282 MG/DL — HIGH (ref 70–99)
MAGNESIUM SERPL-MCNC: 2.3 MG/DL — SIGNIFICANT CHANGE UP (ref 1.8–2.4)
POTASSIUM SERPL-MCNC: 5.8 MMOL/L — HIGH (ref 3.5–5)
POTASSIUM SERPL-SCNC: 5.8 MMOL/L — HIGH (ref 3.5–5)
PROT SERPL-MCNC: 6.4 G/DL — SIGNIFICANT CHANGE UP (ref 6–8)
SODIUM SERPL-SCNC: 138 MMOL/L — SIGNIFICANT CHANGE UP (ref 135–146)
SPECIMEN SOURCE: SIGNIFICANT CHANGE UP

## 2020-04-12 PROCEDURE — 99239 HOSP IP/OBS DSCHRG MGMT >30: CPT

## 2020-04-12 RX ORDER — INSULIN GLARGINE 100 [IU]/ML
15 INJECTION, SOLUTION SUBCUTANEOUS ONCE
Refills: 0 | Status: COMPLETED | OUTPATIENT
Start: 2020-04-12 | End: 2020-04-12

## 2020-04-12 RX ORDER — INSULIN LISPRO 100/ML
20 VIAL (ML) SUBCUTANEOUS
Refills: 0 | Status: DISCONTINUED | OUTPATIENT
Start: 2020-04-12 | End: 2020-04-12

## 2020-04-12 RX ORDER — ISOPROPYL ALCOHOL, BENZOCAINE .7; .06 ML/ML; ML/ML
1 SWAB TOPICAL
Qty: 100 | Refills: 1
Start: 2020-04-12 | End: 2020-05-31

## 2020-04-12 RX ORDER — INSULIN GLARGINE 100 [IU]/ML
60 INJECTION, SOLUTION SUBCUTANEOUS
Qty: 18 | Refills: 0
Start: 2020-04-12 | End: 2020-05-11

## 2020-04-12 RX ORDER — CEFPODOXIME PROXETIL 100 MG
1 TABLET ORAL
Qty: 20 | Refills: 0
Start: 2020-04-12 | End: 2020-04-21

## 2020-04-12 RX ORDER — METFORMIN HYDROCHLORIDE 850 MG/1
1 TABLET ORAL
Qty: 60 | Refills: 0
Start: 2020-04-12 | End: 2020-05-11

## 2020-04-12 RX ORDER — GLIMEPIRIDE 1 MG
1 TABLET ORAL
Qty: 30 | Refills: 0
Start: 2020-04-12 | End: 2020-05-11

## 2020-04-12 RX ORDER — SITAGLIPTIN 50 MG/1
1 TABLET, FILM COATED ORAL
Qty: 30 | Refills: 0
Start: 2020-04-12 | End: 2020-05-11

## 2020-04-12 RX ORDER — INSULIN GLARGINE 100 [IU]/ML
60 INJECTION, SOLUTION SUBCUTANEOUS AT BEDTIME
Refills: 0 | Status: DISCONTINUED | OUTPATIENT
Start: 2020-04-12 | End: 2020-04-12

## 2020-04-12 RX ORDER — METFORMIN HYDROCHLORIDE 850 MG/1
500 TABLET ORAL
Refills: 0 | Status: DISCONTINUED | OUTPATIENT
Start: 2020-04-12 | End: 2020-04-12

## 2020-04-12 RX ADMIN — Medication 200 MILLIGRAM(S): at 05:26

## 2020-04-12 RX ADMIN — Medication 15 UNIT(S): at 08:09

## 2020-04-12 RX ADMIN — Medication 200 MILLIGRAM(S): at 18:56

## 2020-04-12 RX ADMIN — Medication 8: at 11:43

## 2020-04-12 RX ADMIN — Medication 6: at 08:10

## 2020-04-12 RX ADMIN — METFORMIN HYDROCHLORIDE 500 MILLIGRAM(S): 850 TABLET ORAL at 18:55

## 2020-04-12 RX ADMIN — Medication 6: at 16:58

## 2020-04-12 RX ADMIN — METFORMIN HYDROCHLORIDE 500 MILLIGRAM(S): 850 TABLET ORAL at 13:08

## 2020-04-12 RX ADMIN — CHLORHEXIDINE GLUCONATE 1 APPLICATION(S): 213 SOLUTION TOPICAL at 05:26

## 2020-04-12 RX ADMIN — INSULIN GLARGINE 60 UNIT(S): 100 INJECTION, SOLUTION SUBCUTANEOUS at 21:53

## 2020-04-12 RX ADMIN — Medication 15 UNIT(S): at 11:42

## 2020-04-12 RX ADMIN — INSULIN GLARGINE 15 UNIT(S): 100 INJECTION, SOLUTION SUBCUTANEOUS at 13:08

## 2020-04-12 RX ADMIN — Medication 20 UNIT(S): at 16:59

## 2020-04-12 NOTE — PROGRESS NOTE ADULT - SUBJECTIVE AND OBJECTIVE BOX
CHANDA NIETO  44y  Male  ***My note supersedes ALL resident notes that I sign.  My corrections for their notes are in my note.***    I can be reached directly on [employer]. My office number is [phone]. My personal cell number is [phone].    INTERVAL EVENTS: Here for f/u of DM. Pt looks fine. Ready for d/c. Pt feels he can learn to do shots. I spoke w/ floor RN, she will try to teach him.    T(F): 97.6 (04-12-20 @ 05:14), Max: 97.6 (04-12-20 @ 05:14)  HR: 80 (04-12-20 @ 05:14) (75 - 84)  BP: 112/57 (04-12-20 @ 05:14) (112/57 - 142/85)  RR: 20 (04-12-20 @ 05:14) (18 - 20)  SpO2: 100% (04-11-20 @ 18:53) (100% - 100%)    Gen: NAD; obese  HEENT: WNL  Neck: no JVD  lungs: clr  Hrt: s1 s2 rrr  Abd: soft, NT/ND  Ext: no edema, no c/c  neuro: WNL    LABS:                        14.3    (    91.1   7.89  )-----------( ---------      110      ( 11 Apr 2020 05:37 )             42.8    (    12.4     138   (   106   (   282      04-12-20 @ 05:37  ----------------------               5.8   (   18   (   24      AG = 14  hemolyzed                     -----                        1.1  Ca  8.5   Mg  2.3    P   --     LFT  6.4  (  0.4  (  53       04-12-20 @ 05:37  -------------------------  3.2  (  94  (  47    CAPILLARY BLOOD GLUCOSE  POCT Blood Glucose.: 313 (04-12-20 @ 11:24)  POCT Blood Glucose.: 290 (04-12-20 @ 07:41)  POCT Blood Glucose.: 267 (04-11-20 @ 21:27)  POCT Blood Glucose.: 435 (04-11-20 @ 16:55)  POCT Blood Glucose.: 519 (04-11-20 @ 13:48)  POCT Blood Glucose.: 565 (04-11-20 @ 11:53)  POCT Blood Glucose.: 381 (04-11-20 @ 07:49)  POCT Blood Glucose.: 266 (04-11-20 @ 03:12)    RADIOLOGY & ADDITIONAL TESTS:      MEDICATIONS:  cefpodoxime 200 milliGRAM(s) Oral every 12 hours    chlorhexidine 4% Liquid 1 Application(s) Topical <User Schedule>  insulin glargine Injectable (LANTUS) 60 Unit(s) SubCutaneous at bedtime  insulin lispro (HumaLOG) corrective regimen sliding scale   SubCutaneous three times a day before meals  insulin lispro Injectable (HumaLOG) 15 Unit(s) SubCutaneous three times a day before meals  metFORMIN 500 milliGRAM(s) Oral two times a day

## 2020-04-12 NOTE — PROGRESS NOTE ADULT - ASSESSMENT
# New Onset DM w/ DKA likely T2 (given age, obesity, strong fam hx of DM and resistance to insulin)  I spoke w/ Dr Ag yesterday  pt was in ICU and now on floor, clinically looks well  pt is eating and drinking very well  HCO3 is better (but not perfect); AG is 14  glucose is better but still high - give lantus 15 units extra now  incr Lantus 60 HS and lisp 20/meal w/ +2 CF scale - use while here  FS qac and HS and keep btw 100-180 -> residents can raise insulin doses to tighten control  given his autism, I would like to try mostly oral meds (Endo and I worked out a d/c plan below)   Lantus HS - 60 units HS   Amaryl 4mg po qAM  Metformin 500mg po q12  Januvia 100mg qAM  hold off on actos  will likely need further titration  could consider long-acting GLP-1 analog as outpt  can have telemed consult w/ ade (Dr Ag) as outpt [phone]  needs glucometer, lancet, alcohol swabs and test strips to check FS 2-3x/day (before meals) and keep log book  CM will send in VNS to diabetic teaching  if pt returns w/ DKA, then he will likely need a full insulin regimen going forwards    # GNR in blood (bacteremia), no sepsis on admit  I spoke w/ yesica in [place] on 4/11 ([phone]) - it seems like it will be Moraxella [place]ensis  the etiology of where this arose in uncertain  there is a neg bld cx from the same date (perhaps the positive one is a contaminate?)  pt looks clinically well  will treat w/ Vantin 200mg po q12 for 9 more days    # As outpt, will need to see ophth    # once sugars better, will needs lipids checked (as outpt)    # BMI 35.2 c/w obeisty  wt loss strongly advised to pt  needs to watch his diet very carefully, rowena sweets    # thrombocytopenia developing here - perhaps due to heparin - d/c it  f/u as outpt    # GISELA 2/2 dehydration from uncontrolled DM  K+ of 5.8 is hemolyzed - no need to tx  seems to have resolved  he might have start of CKD, but u/a shows only tr prot and albumin is 3.5  outpt f/u w/ IM (could consider renal eval as needed)    # mildly high Mg - not on Mg products  now w/ nl Mg    # DVT ppx: ambulation    [facility] on [address]    # Contacts:  Shena Tinsley (mom): [phone]  Michael Nancie (sister): [phone]    Dispo: plan as above; d/c today  CM (Neha - x [phone]) has set up visiting RN and ambulette to go home # New Onset DM w/ DKA likely T2 (given age, obesity, strong fam hx of DM and resistance to insulin)  I spoke w/ Dr Ag yesterday  pt was in ICU and now on floor, clinically looks well  pt is eating and drinking very well  HCO3 is better (but not perfect); AG is 14  glucose is better but still high - give lantus 15 units extra now  incr Lantus 60 HS and lisp 20/meal w/ +2 CF scale - use while here  FS qac and HS and keep btw 100-180 -> residents can raise insulin doses to tighten control  given his autism, I would like to try mostly oral meds (Endo and I worked out a d/c plan below)   Lantus HS - 60 units HS   Amaryl 4mg po qAM  Metformin 500mg po q12  Januvia 100mg qAM  hold off on actos  will likely need further titration  could consider long-acting GLP-1 analog as outpt  can have telemed consult w/ ade (Dr Ag) as outpt [phone]  needs glucometer, lancet, alcohol swabs and test strips to check FS 2-3x/day (before meals) and keep log book  CM will send in VNS to diabetic teaching  if pt returns w/ DKA, then he will likely need a full insulin regimen going forwards    # GNR in blood (bacteremia), no sepsis on admit  I spoke w/ yesica in [place] on 4/11 ([phone]) - it seems like it will be Moraxella [place]ensis  the etiology of where this arose in uncertain  there is a neg bld cx from the same date (perhaps the positive one is a contaminate?)  pt looks clinically well  will treat w/ Vantin 200mg po q12 for 9 more days    # As outpt, will need to see ophth    # once sugars better, will needs lipids checked (as outpt)    # BMI 35.2 c/w obeisty  wt loss strongly advised to pt  needs to watch his diet very carefully, rowena sweets    # thrombocytopenia developing here - perhaps due to heparin - d/c it  f/u as outpt    # GISELA 2/2 dehydration from uncontrolled DM  K+ of 5.8 is hemolyzed - no need to tx  seems to have resolved  he might have start of CKD, but u/a shows only tr prot and albumin is 3.5  outpt f/u w/ IM (could consider renal eval as needed)    # mildly high Mg - not on Mg products  now w/ nl Mg    # DVT ppx: ambulation    [facility] [address]    # Contacts:  Shena Tinsley (mom): [phone]: we spoke about DM; dtr is diabetic and on insulin, so she can help w/ shots; we spoke about need for abx as above; she is agreeable to him coming home today  Michael Canada (sister): [phone]    Dispo: plan as above; d/c today  CM (Nhea - x [phone]) has set up visiting RN and ambulette to go home

## 2020-04-12 NOTE — PROGRESS NOTE ADULT - ASSESSMENT
Recommend increase in Glargine to 60 units qHs  Begin (first doses now):  metformin 500 mg bid  januvia 100 mg qd  glimiperide 4 md qd    If BS improve as day progresses can consider discharge in PM on above regimen.  If not improved would remain overnight and reassess in AM.

## 2020-04-12 NOTE — PROGRESS NOTE ADULT - ASSESSMENT
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        # New Onset DM w/ DKA likely T2 (given age, obesity and resistance to insulin)  -previous in ICU, now downgraded to floor, eating drinking well   -currently on lantus 45 qhs, lispro 15mg w/ meals w/ +2 Sliding scale  -HCO3: , AG:   -per Dr Ag -> will require insulin on d/c, will need VNS  -FS qac and HS and keep btw 100-180   -given his autism, would try mostly oral meds (plan worked out by Liana/Albert below)   	Lantus HS - prob 50 units, but might make it higher tomorrow  	Amaryl 4mg po qAM  	Metformin 500mg po q12  	Januvia 100mg qAM  	hold off on actos  	could consider long-acting GLP-1 analog as outpt  -telemed consult w/ endo (Dr Ag) as outpt [phone]  -needs glucometer, lancet, alcohol swabs and test strips to check FS 2-3x/day (before meals) and keep log book  -CM will send in VNS to diabetic teaching  -if pt returns w/ DKA, then he will likely need a full insulin regimen going forwards    # GNR in blood (bacteremia), no sepsis on admit  -per [employer] in [place] ([phone]) - it seems like it will be Moraxella [place]ensis, etiology uncertain, possible contaminate, additional blood culture same date negative  -clinically appears well  -c/w Vantin 200mg po q12 for 9 more days    # As outpt, will need to see ophth    # BMI 35.2 c/w obesity  -repeat lipid panel as o/p  -educated pt on diet, avoiding sweets     # thrombocytopenia developing here - perhaps due to heparin - d/c it    # GISELA 2/2 dehydration from uncontrolled DM  -resolved  -outpt f/u w/ IM (could consider renal eval as needed)    # mildly high Mg - not on Mg products  -continue to monitor     # DVT ppx: ambulation, d/c heparin 2/2 thrombocytopenia    Dispo: plan as above; poss d/c today if FS more controlled (in 200s) and gap close

## 2020-04-12 NOTE — PROGRESS NOTE ADULT - SUBJECTIVE AND OBJECTIVE BOX
HPI  Patient is a 44y old Male who presents with a chief complaint of DKA (11 Apr 2020 12:02)    Currently admitted to medicine with the primary diagnosis of DKA (diabetic ketoacidoses)     Today is hospital day 4d.     INTERVAL HPI / OVERNIGHT EVENTS:  Patient was examined and seen at bedside. This morning he is resting comfortably in bed and reports no new issues or overnight events.     ROS: Otherwise unremarkable     PAST MEDICAL & SURGICAL HISTORY  Hearing loss: right ear, congenital ( born without an earlobe)  Cryptorchidism  Autism    ALLERGIES  No Known Allergies    MEDICATIONS  STANDING MEDICATIONS  cefpodoxime 200 milliGRAM(s) Oral every 12 hours  chlorhexidine 4% Liquid 1 Application(s) Topical <User Schedule>  dextrose 5%. 1000 milliLiter(s) IV Continuous <Continuous>  dextrose 50% Injectable 12.5 Gram(s) IV Push once  dextrose 50% Injectable 25 Gram(s) IV Push once  dextrose 50% Injectable 25 Gram(s) IV Push once  insulin glargine Injectable (LANTUS) 45 Unit(s) SubCutaneous at bedtime  insulin lispro (HumaLOG) corrective regimen sliding scale   SubCutaneous three times a day before meals  insulin lispro Injectable (HumaLOG) 15 Unit(s) SubCutaneous three times a day before meals    PRN MEDICATIONS  dextrose 40% Gel 15 Gram(s) Oral once PRN  glucagon  Injectable 1 milliGRAM(s) IntraMuscular once PRN    VITALS:  T(F): 97.6  HR: 80  BP: 112/57  RR: 20  SpO2: 100%    PHYSICAL EXAM  GEN: NAD, Resting comfortably in bed  PULM: Clear to auscultation bilaterally, No wheezes  CVS: Regular rate and rhythm, S1-S2, no murmurs  ABD: Soft, non-tender, non-distended, no guarding  EXT: No edema  NEURO: A&Ox3, no focal deficits    LABS                        14.3   7.89  )-----------( 110      ( 11 Apr 2020 05:37 )             42.8     04-11    136  |  101  |  27<H>  ----------------------------<  309<H>  4.4   |  23  |  1.1    Ca    8.5      11 Apr 2020 22:17  Mg     3.1     04-11    TPro  6.9  /  Alb  3.5  /  TBili  0.6  /  DBili  x   /  AST  50<H>  /  ALT  48<H>  /  AlkPhos  121<H>  04-11                  RADIOLOGY

## 2020-04-13 LAB
GLUCOSE BLDC GLUCOMTR-MCNC: 189 MG/DL — HIGH (ref 70–99)
GLUCOSE BLDC GLUCOMTR-MCNC: 426 MG/DL — HIGH (ref 70–99)
GLUCOSE BLDC GLUCOMTR-MCNC: 437 MG/DL — HIGH (ref 70–99)

## 2020-04-15 DIAGNOSIS — E66.9 OBESITY, UNSPECIFIED: ICD-10-CM

## 2020-04-15 DIAGNOSIS — E11.00 TYPE 2 DIABETES MELLITUS WITH HYPEROSMOLARITY WITHOUT NONKETOTIC HYPERGLYCEMIC-HYPEROSMOLAR COMA (NKHHC): ICD-10-CM

## 2020-04-15 DIAGNOSIS — N18.9 CHRONIC KIDNEY DISEASE, UNSPECIFIED: ICD-10-CM

## 2020-04-15 DIAGNOSIS — Z78.1 PHYSICAL RESTRAINT STATUS: ICD-10-CM

## 2020-04-15 DIAGNOSIS — M62.82 RHABDOMYOLYSIS: ICD-10-CM

## 2020-04-15 DIAGNOSIS — F84.0 AUTISTIC DISORDER: ICD-10-CM

## 2020-04-15 DIAGNOSIS — E11.10 TYPE 2 DIABETES MELLITUS WITH KETOACIDOSIS WITHOUT COMA: ICD-10-CM

## 2020-04-15 DIAGNOSIS — E86.0 DEHYDRATION: ICD-10-CM

## 2020-04-15 DIAGNOSIS — R78.81 BACTEREMIA: ICD-10-CM

## 2020-04-15 DIAGNOSIS — Q53.9 UNDESCENDED TESTICLE, UNSPECIFIED: ICD-10-CM

## 2020-04-15 DIAGNOSIS — N17.9 ACUTE KIDNEY FAILURE, UNSPECIFIED: ICD-10-CM

## 2020-04-15 DIAGNOSIS — Z83.3 FAMILY HISTORY OF DIABETES MELLITUS: ICD-10-CM

## 2020-04-15 DIAGNOSIS — D69.59 OTHER SECONDARY THROMBOCYTOPENIA: ICD-10-CM

## 2020-04-15 DIAGNOSIS — R45.1 RESTLESSNESS AND AGITATION: ICD-10-CM

## 2020-04-15 DIAGNOSIS — Q16.0 CONGENITAL ABSENCE OF (EAR) AURICLE: ICD-10-CM

## 2020-04-15 DIAGNOSIS — G93.41 METABOLIC ENCEPHALOPATHY: ICD-10-CM

## 2020-04-15 DIAGNOSIS — E11.22 TYPE 2 DIABETES MELLITUS WITH DIABETIC CHRONIC KIDNEY DISEASE: ICD-10-CM

## 2020-04-15 DIAGNOSIS — Z79.84 LONG TERM (CURRENT) USE OF ORAL HYPOGLYCEMIC DRUGS: ICD-10-CM

## 2020-04-15 DIAGNOSIS — E83.41 HYPERMAGNESEMIA: ICD-10-CM

## 2020-06-03 LAB
CULTURE RESULTS: SIGNIFICANT CHANGE UP
ORGANISM # SPEC MICROSCOPIC CNT: SIGNIFICANT CHANGE UP
ORGANISM # SPEC MICROSCOPIC CNT: SIGNIFICANT CHANGE UP
SPECIMEN SOURCE: SIGNIFICANT CHANGE UP
